# Patient Record
Sex: MALE | Race: WHITE | NOT HISPANIC OR LATINO | Employment: FULL TIME | URBAN - NONMETROPOLITAN AREA
[De-identification: names, ages, dates, MRNs, and addresses within clinical notes are randomized per-mention and may not be internally consistent; named-entity substitution may affect disease eponyms.]

---

## 2017-02-07 ENCOUNTER — OFFICE VISIT (OUTPATIENT)
Dept: FAMILY MEDICINE CLINIC | Facility: CLINIC | Age: 32
End: 2017-02-07

## 2017-02-07 VITALS
HEART RATE: 82 BPM | SYSTOLIC BLOOD PRESSURE: 108 MMHG | HEIGHT: 72 IN | WEIGHT: 206 LBS | DIASTOLIC BLOOD PRESSURE: 72 MMHG | OXYGEN SATURATION: 99 % | BODY MASS INDEX: 27.9 KG/M2

## 2017-02-07 DIAGNOSIS — R79.89 ABNORMAL LFTS: ICD-10-CM

## 2017-02-07 DIAGNOSIS — Z72.0 TOBACCO ABUSE: ICD-10-CM

## 2017-02-07 DIAGNOSIS — K21.9 GASTROESOPHAGEAL REFLUX DISEASE WITHOUT ESOPHAGITIS: ICD-10-CM

## 2017-02-07 DIAGNOSIS — F41.9 ANXIETY: ICD-10-CM

## 2017-02-07 DIAGNOSIS — E78.2 MIXED HYPERLIPIDEMIA: Primary | ICD-10-CM

## 2017-02-07 DIAGNOSIS — N39.43 DRIBBLING FOLLOWING URINATION: ICD-10-CM

## 2017-02-07 DIAGNOSIS — E55.9 VITAMIN D DEFICIENCY: ICD-10-CM

## 2017-02-07 LAB
ALBUMIN SERPL-MCNC: 4.6 G/DL (ref 3.5–5)
ALBUMIN/GLOB SERPL: 1.4 G/DL (ref 1.5–2.5)
ALP SERPL-CCNC: 60 U/L (ref 46–116)
ALT SERPL W P-5'-P-CCNC: 72 U/L (ref 10–44)
ANION GAP SERPL CALCULATED.3IONS-SCNC: 2.5 MMOL/L (ref 3.6–11.2)
AST SERPL-CCNC: 41 U/L (ref 10–34)
BASOPHILS # BLD AUTO: 0.08 10*3/MM3 (ref 0–0.3)
BASOPHILS NFR BLD AUTO: 1.4 % (ref 0–2)
BILIRUB BLD-MCNC: NEGATIVE MG/DL
BILIRUB SERPL-MCNC: 0.5 MG/DL (ref 0.2–1.8)
BUN BLD-MCNC: 15 MG/DL (ref 7–21)
BUN/CREAT SERPL: 16.7 (ref 7–25)
CALCIUM SPEC-SCNC: 9.5 MG/DL (ref 7.7–10)
CHLORIDE SERPL-SCNC: 108 MMOL/L (ref 99–112)
CHOLEST SERPL-MCNC: 209 MG/DL (ref 0–200)
CLARITY, POC: CLEAR
CO2 SERPL-SCNC: 29.5 MMOL/L (ref 24.3–31.9)
COLOR UR: YELLOW
CREAT BLD-MCNC: 0.9 MG/DL (ref 0.43–1.29)
DEPRECATED RDW RBC AUTO: 40.8 FL (ref 37–54)
EOSINOPHIL # BLD AUTO: 0.25 10*3/MM3 (ref 0–0.7)
EOSINOPHIL NFR BLD AUTO: 4.2 % (ref 0–5)
ERYTHROCYTE [DISTWIDTH] IN BLOOD BY AUTOMATED COUNT: 13.1 % (ref 11.5–14.5)
GFR SERPL CREATININE-BSD FRML MDRD: 100 ML/MIN/1.73
GLOBULIN UR ELPH-MCNC: 3.2 GM/DL
GLUCOSE BLD-MCNC: 100 MG/DL (ref 70–110)
GLUCOSE UR STRIP-MCNC: NEGATIVE MG/DL
HCT VFR BLD AUTO: 44.8 % (ref 42–52)
HDLC SERPL-MCNC: 38 MG/DL (ref 60–100)
HGB BLD-MCNC: 15.2 G/DL (ref 14–18)
IMM GRANULOCYTES # BLD: 0.01 10*3/MM3 (ref 0–0.03)
IMM GRANULOCYTES NFR BLD: 0.2 % (ref 0–0.5)
KETONES UR QL: ABNORMAL
LDLC SERPL CALC-MCNC: 123 MG/DL (ref 0–100)
LDLC/HDLC SERPL: 3.25 {RATIO}
LEUKOCYTE EST, POC: NEGATIVE
LYMPHOCYTES # BLD AUTO: 2.37 10*3/MM3 (ref 1–3)
LYMPHOCYTES NFR BLD AUTO: 40.1 % (ref 21–51)
MCH RBC QN AUTO: 29.3 PG (ref 27–33)
MCHC RBC AUTO-ENTMCNC: 33.9 G/DL (ref 33–37)
MCV RBC AUTO: 86.5 FL (ref 80–94)
MONOCYTES # BLD AUTO: 0.54 10*3/MM3 (ref 0.1–0.9)
MONOCYTES NFR BLD AUTO: 9.1 % (ref 0–10)
NEUTROPHILS # BLD AUTO: 2.66 10*3/MM3 (ref 1.4–6.5)
NEUTROPHILS NFR BLD AUTO: 45 % (ref 30–70)
NITRITE UR-MCNC: NEGATIVE MG/ML
OSMOLALITY SERPL CALC.SUM OF ELEC: 280.3 MOSM/KG (ref 273–305)
PH UR: 6 [PH] (ref 5–8)
PLATELET # BLD AUTO: 378 10*3/MM3 (ref 130–400)
PMV BLD AUTO: 10 FL (ref 6–10)
POTASSIUM BLD-SCNC: 4.3 MMOL/L (ref 3.5–5.3)
PROT SERPL-MCNC: 7.8 G/DL (ref 6–8)
PROT UR STRIP-MCNC: NEGATIVE MG/DL
RBC # BLD AUTO: 5.18 10*6/MM3 (ref 4.7–6.1)
RBC # UR STRIP: NEGATIVE /UL
SODIUM BLD-SCNC: 140 MMOL/L (ref 135–153)
SP GR UR: 1.03 (ref 1–1.03)
TRIGL SERPL-MCNC: 238 MG/DL (ref 0–150)
UROBILINOGEN UR QL: NORMAL
VLDLC SERPL-MCNC: 47.6 MG/DL
WBC NRBC COR # BLD: 5.91 10*3/MM3 (ref 4.5–12.5)

## 2017-02-07 PROCEDURE — 80053 COMPREHEN METABOLIC PANEL: CPT | Performed by: FAMILY MEDICINE

## 2017-02-07 PROCEDURE — 82977 ASSAY OF GGT: CPT | Performed by: FAMILY MEDICINE

## 2017-02-07 PROCEDURE — 99214 OFFICE O/P EST MOD 30 MIN: CPT | Performed by: FAMILY MEDICINE

## 2017-02-07 PROCEDURE — 85025 COMPLETE CBC W/AUTO DIFF WBC: CPT | Performed by: FAMILY MEDICINE

## 2017-02-07 PROCEDURE — 36415 COLL VENOUS BLD VENIPUNCTURE: CPT | Performed by: FAMILY MEDICINE

## 2017-02-07 PROCEDURE — 81003 URINALYSIS AUTO W/O SCOPE: CPT | Performed by: FAMILY MEDICINE

## 2017-02-07 PROCEDURE — 80061 LIPID PANEL: CPT | Performed by: FAMILY MEDICINE

## 2017-02-07 RX ORDER — ERGOCALCIFEROL (VITAMIN D2) 10 MCG
400 TABLET ORAL DAILY
COMMUNITY
End: 2017-08-09 | Stop reason: HOSPADM

## 2017-02-07 RX ORDER — BUPROPION HYDROCHLORIDE 150 MG/1
TABLET, EXTENDED RELEASE ORAL
Qty: 60 TABLET | Refills: 2 | Status: SHIPPED | OUTPATIENT
Start: 2017-02-07 | End: 2017-08-09 | Stop reason: HOSPADM

## 2017-02-07 NOTE — PATIENT INSTRUCTIONS
We will call you with lab results.  Start wellbutrin once a day x 3 days, then do twice a day x 3 months.   Come back in three months and we will reassess.

## 2017-02-09 ENCOUNTER — TELEPHONE (OUTPATIENT)
Dept: FAMILY MEDICINE CLINIC | Facility: CLINIC | Age: 32
End: 2017-02-09

## 2017-02-09 DIAGNOSIS — R79.89 ABNORMAL LFTS: Primary | ICD-10-CM

## 2017-02-09 LAB — GGT SERPL-CCNC: 55 U/L (ref 11–50)

## 2017-02-09 NOTE — TELEPHONE ENCOUNTER
Ok. GGT is positive so this means that liver enzymes are definitely elevated because of the liver and not because of something else. Maybe he just has a fatty liver. Thanks.

## 2017-02-09 NOTE — TELEPHONE ENCOUNTER
----- Message from Matilde Brar MD sent at 2/8/2017  5:50 PM EST -----  Can we add a GGT (gamma-glutamyl transferase) to this?     Please call patient. We should be really excited that he has lost almost 10 pounds in half of a year, but ...  1) His liver enzymes are still elevated. Compared to his mid-2016 levels, they are about the same (59 and 31). He does not need to come in special to this, but I think it may be time to do a workup next time he comes in. I'll discuss more of the labs that we can order (and if we can order the GGT, please do so because that is a liver enzyme specific to the liver). The CT chest that we got last year did catch some of the liver and it looked fine, so I am not too worried about it, but we should try to figure out why the numbers are still elevated if he is doing the right stuff. Remind him lots of water, and decrease protein powder if he is taking that.  2) The cholesterol numbers are also very similar to what they are last year. They are some better, but honestly, it is pretty much the same. The current guidelines state that he's fine - he just needs to diet and exercise; the old guidelines recommend something like a fibrate, which I really don't wish for him secondary to his elevated liver enzymes. They are better so he has made some good choices, but this may also be very genetic. Like #1, I'd like to do the liver workup so I know if we can give him something for cholesterol. Thanks.      First i was able to add the lab,second spoke with patient & he verbalized understanding,also wanted you to know that the weekend prior to these labs he had consumed an abnormally large amout of alcohol which he does not do very often.

## 2017-02-20 NOTE — PROGRESS NOTES
"Adiel Olmos     VITALS: Blood pressure 108/72, pulse 82, height 72\" (182.9 cm), weight 206 lb (93.4 kg), SpO2 99 %.    Subjective  Chief Complaint:   Chief Complaint   Patient presents with   • Follow-up        History of Present Illness:  Patient is a 30 y.o. male with a medical history significant for anxiety who presents to clinic secondary to medical followup. Some ongoing concerns; otherwise doing fairly well. Wife is currently pregnant with Child #2.     1) Had seen surgery regarding his bony lesion; decision was made to monitor and that it is most likely benign cartilage. Patient is satisfied with this conclusion.     2) Had discussed lack of concentration ability and focus at his last visit. States that he catches himself not participating in conversations and thinking of other things. States that he is a little worried because he just feels a little bit off. This continues to be stable. Thinks that it may be anxiety. Symptoms have not worsened. May have improved slightly.     3) Would like to try a medication to stop smoking.     Patient also has a history of hyperlipidemia and is currently on diet and exercise.Last lipid panel in 2/2016 and was elevated. Denies any muscle weakness, jaundice or itching.   Low cholesterol diet: Yes    Patient has a history of GERD and is currently on no medications. Denies metallic tastes and has not been having increased symptoms during sleep.Has not had any recent exacerbations. Denies any nausea, vomiting, burping, hiccuping, or midepigastric pain.    Patient has a history of anxiety and is currently on no medications. Patient states that he is sleeping fairly well and can get out of bed daily to accomplish daily activities. Patient has anhedonia. Mood is anxious. Has no feelings of guilt. Has poor concentration and memory ability. Has no energy. Is able to make goals.Denies any suicidal or homicidal ideations. Does not have any auditory or visual hallucinations. "     The following portions of the patient's history were reviewed and updated as appropriate: allergies, current medications, past family history, past medical history, past social history, past surgical history and problem list.    Past Medical History  Past Medical History   Diagnosis Date   • Anxiety    • Fatigue    • GERD (gastroesophageal reflux disease)    • Hyperlipidemia    • Lower back pain    • Vitamin D deficiency        Review of Systems  Constitutional: Denies any recent history of HAs, dizziness, fevers, chills, itching.  Eyes: Denies any changes in vision. Denies any blurry vision or diplopia.  Ears, Nose, Mouth, Throat: Denies any sore throat, rhinorrhea, or cough.  Cardiovascular: Denies any chest pain, pressure, or palpitations.  Respiratory: Denies any shortness of breath or wheezing.  Gastrointestinal: Denies any abdominal pain, nausea, vomiting, diarrhea, or constipation.  Genitourinary: Denies any changes in urination.  Musculoskeletal: Denies any muscle weakness.  Skin and/or breasts: Denies any rashes.  Neurological: Denies any changes in balance or gait.  Psychiatric: Denies any suicidal or homicidal ideations.  Endocrine: Denies any heat or cold intolerance. Denies any voice changes, polydipsia, or polyuria.  Hematologic/Lymphatic: Denies any anemia or easy bruising.    Surgical History  Past Surgical History   Procedure Laterality Date   • Eye surgery         Family History  Family History   Problem Relation Age of Onset   • Thyroid disease Mother    • Diabetes Mother    • Hypertension Father    • Cancer Maternal Grandfather        Social History  Social History     Social History   • Marital status:      Spouse name: N/A   • Number of children: N/A   • Years of education: N/A     Occupational History   • Not on file.     Social History Main Topics   • Smoking status: Current Every Day Smoker     Packs/day: 0.50     Types: Cigarettes   • Smokeless tobacco: Never Used   • Alcohol use  No   • Drug use: No   • Sexual activity: Not on file     Other Topics Concern   • Not on file     Social History Narrative       Objective  Physical Exam  Gen: Patient in NAD. Pleasant and answers appropriately. A&Ox3.    Skin: Warm and dry with normal turgor. No purpura, rashes, or unusual pigmentation noted. Hair is normal in appearance and distribution.    HEENT: NC/AT. No lesions noted. Conjunctiva clear, sclera nonicteric. PERRL. O/P nonerythematous and moist without exudate.    Neck: Supple without lymph nodes palpated. FROM. No evidence of tracheal deviation or thyromegaly. Carotid pulses 2+/4 B/L without bruits.    Lungs: CTA B/L without rales, rhonchi, crackles, or wheezes.    Heart: RRR. S1 and S2 normal. No S3 or S4. No MRGT.    Abd: Soft, nontender,nondistended. (+)BSx4 quadrants.  No HSM, masses, or bruits noted.    Extrem: No CCE. Radial and dorsalis pedis pulses 2+/4 and equal B/L. FROMx4. No bone, joint, or muscle tenderness noted.    Neuro: No focal motor/sensory deficits.    Procedures    Assessment/Plan  Adiel Olmos is a 31 y.o. here for medical followup.  Diagnoses and all orders for this visit:    1) Mixed hyperlipidemia  -     CBC & Differential  -     Comprehensive Metabolic Panel  -     Lipid Panel  -     CBC Auto Differential  -     Osmolality, Calculated; Future  -     Osmolality, Calculated  Currently on diet and exercise. Will check labs today: CBC, CMP, lipids.    2) Dribbling following urination  -     POC Urinalysis Dipstick, Automated  Will increase fluids. U/A benign. Uncertain etiology. Continue to monitor.    3) Abnormal LFTs  -     Gamma GT  History of elevation of liver enzymes. Will check CMP and GGT today. Most likely secondary to fatty liver.    4) Gastroesophageal reflux disease without esophagitis  Stable. Continue to monitor.      5) Vitamin D deficiency  Stable. Continue Vitamin D repletion. Continue to monitor.      6) Anxiety  Patient does not want any anti-anxiety  medications; already sees a therapist for CBT. Will continue to monitor. Wellbutrin may help.      7) Tobacco abuse  After discussion, will start patient on wellbutrin SR !50 mg orally BID. Will monitor. Follow up in three months.     Other orders  -     buPROPion SR (WELLBUTRIN SR) 150 MG 12 hr tablet; Start at 150 mg orally daily x 3 days, and then do 150 mg orally BID x 12 weeks.    Findings and plans discussed with patient who verbalizes understanding and agreement. Will followup with patient once results are in. Patient to followup at clinic PRN or in three months for medical followup.    Matilde Brar MD

## 2017-03-27 ENCOUNTER — OFFICE VISIT (OUTPATIENT)
Dept: FAMILY MEDICINE CLINIC | Facility: CLINIC | Age: 32
End: 2017-03-27

## 2017-03-27 VITALS
DIASTOLIC BLOOD PRESSURE: 81 MMHG | WEIGHT: 207 LBS | HEIGHT: 72 IN | HEART RATE: 96 BPM | SYSTOLIC BLOOD PRESSURE: 129 MMHG | BODY MASS INDEX: 28.04 KG/M2 | OXYGEN SATURATION: 99 %

## 2017-03-27 DIAGNOSIS — K21.9 GASTROESOPHAGEAL REFLUX DISEASE WITHOUT ESOPHAGITIS: ICD-10-CM

## 2017-03-27 DIAGNOSIS — F41.9 ANXIETY: ICD-10-CM

## 2017-03-27 DIAGNOSIS — J02.9 SORE THROAT: ICD-10-CM

## 2017-03-27 DIAGNOSIS — R74.8 ELEVATED LIVER ENZYMES: Primary | ICD-10-CM

## 2017-03-27 DIAGNOSIS — E78.2 MIXED HYPERLIPIDEMIA: ICD-10-CM

## 2017-03-27 LAB
ALBUMIN SERPL-MCNC: 4.9 G/DL (ref 3.5–5)
ALBUMIN/GLOB SERPL: 1.8 G/DL (ref 1.5–2.5)
ALP SERPL-CCNC: 65 U/L (ref 40–129)
ALT SERPL W P-5'-P-CCNC: 89 U/L (ref 10–44)
ANION GAP SERPL CALCULATED.3IONS-SCNC: 4.7 MMOL/L (ref 3.6–11.2)
AST SERPL-CCNC: 36 U/L (ref 10–34)
BILIRUB SERPL-MCNC: 0.5 MG/DL (ref 0.2–1.8)
BUN BLD-MCNC: 14 MG/DL (ref 7–21)
BUN/CREAT SERPL: 15.6 (ref 7–25)
CALCIUM SPEC-SCNC: 9.7 MG/DL (ref 7.7–10)
CHLORIDE SERPL-SCNC: 106 MMOL/L (ref 99–112)
CO2 SERPL-SCNC: 29.3 MMOL/L (ref 24.3–31.9)
CREAT BLD-MCNC: 0.9 MG/DL (ref 0.43–1.29)
GFR SERPL CREATININE-BSD FRML MDRD: 100 ML/MIN/1.73
GLOBULIN UR ELPH-MCNC: 2.8 GM/DL
GLUCOSE BLD-MCNC: 128 MG/DL (ref 70–110)
OSMOLALITY SERPL CALC.SUM OF ELEC: 281.5 MOSM/KG (ref 273–305)
POTASSIUM BLD-SCNC: 3.9 MMOL/L (ref 3.5–5.3)
PROT SERPL-MCNC: 7.7 G/DL (ref 6–8)
SODIUM BLD-SCNC: 140 MMOL/L (ref 135–153)
T4 FREE SERPL-MCNC: 1.36 NG/DL (ref 0.89–1.76)
TSH SERPL DL<=0.05 MIU/L-ACNC: 0.83 MIU/ML (ref 0.55–4.78)

## 2017-03-27 PROCEDURE — 84443 ASSAY THYROID STIM HORMONE: CPT | Performed by: FAMILY MEDICINE

## 2017-03-27 PROCEDURE — 90471 IMMUNIZATION ADMIN: CPT | Performed by: FAMILY MEDICINE

## 2017-03-27 PROCEDURE — 99214 OFFICE O/P EST MOD 30 MIN: CPT | Performed by: FAMILY MEDICINE

## 2017-03-27 PROCEDURE — 90715 TDAP VACCINE 7 YRS/> IM: CPT | Performed by: FAMILY MEDICINE

## 2017-03-27 PROCEDURE — 80053 COMPREHEN METABOLIC PANEL: CPT | Performed by: FAMILY MEDICINE

## 2017-03-27 PROCEDURE — 80074 ACUTE HEPATITIS PANEL: CPT | Performed by: FAMILY MEDICINE

## 2017-03-27 PROCEDURE — 84439 ASSAY OF FREE THYROXINE: CPT | Performed by: FAMILY MEDICINE

## 2017-03-27 PROCEDURE — 36415 COLL VENOUS BLD VENIPUNCTURE: CPT | Performed by: FAMILY MEDICINE

## 2017-03-28 ENCOUNTER — TELEPHONE (OUTPATIENT)
Dept: FAMILY MEDICINE CLINIC | Facility: CLINIC | Age: 32
End: 2017-03-28

## 2017-03-28 NOTE — TELEPHONE ENCOUNTER
----- Message from Matilde Brar MD sent at 3/27/2017  7:29 PM EDT -----  Please call patient. Liver enzymes still elevated. I know he has stopped drinking. Any Tylenol? Secondary to continued increase, I would actually recommend a hepatitis panel and a RUQ ultrasound if he is okay with it. Thanks.      Spoke with patient & he is agreeable to the u/s & hepatitis panel.He denies drinking alcohol or using Tylenol products.

## 2017-03-29 DIAGNOSIS — R74.8 ELEVATED LIVER ENZYMES: Primary | ICD-10-CM

## 2017-03-29 LAB
HAV IGM SERPL QL IA: NORMAL
HBV CORE IGM SERPL QL IA: NORMAL
HBV SURFACE AG SERPL QL IA: NORMAL
HCV AB SER DONR QL: NORMAL

## 2017-03-29 NOTE — TELEPHONE ENCOUNTER
Okay. I am adding the hepatitis panel on the blood already in lab. Let him know that (I just wanted to make sure he was okay with it), and then scheduling will call him about the U/S. Thanks.      Patient notified.

## 2017-03-29 NOTE — TELEPHONE ENCOUNTER
Okay. I am adding the hepatitis panel on the blood already in lab. Let him know that (I just wanted to make sure he was okay with it), and then scheduling will call him about the U/S. Thanks.

## 2017-03-30 ENCOUNTER — TELEPHONE (OUTPATIENT)
Dept: FAMILY MEDICINE CLINIC | Facility: CLINIC | Age: 32
End: 2017-03-30

## 2017-03-30 NOTE — TELEPHONE ENCOUNTER
----- Message from Matilde Brar MD sent at 3/29/2017  5:24 PM EDT -----  Please let him know negative for hepatitis. Scheduling will call him about U/S. Thanks.      Patient notified & verbalized understanding.

## 2017-04-06 ENCOUNTER — HOSPITAL ENCOUNTER (OUTPATIENT)
Dept: ULTRASOUND IMAGING | Facility: HOSPITAL | Age: 32
Discharge: HOME | End: 2017-04-06
Admitting: FAMILY MEDICINE

## 2017-04-06 PROCEDURE — 76705 ECHO EXAM OF ABDOMEN: CPT | Performed by: RADIOLOGY

## 2017-04-06 PROCEDURE — 76705 ECHO EXAM OF ABDOMEN: CPT

## 2017-04-07 ENCOUNTER — TELEPHONE (OUTPATIENT)
Dept: FAMILY MEDICINE CLINIC | Facility: CLINIC | Age: 32
End: 2017-04-07

## 2017-04-07 NOTE — TELEPHONE ENCOUNTER
----- Message from Matilde Brar MD sent at 4/7/2017 10:12 AM EDT -----  Please call and let him know that U/S okay. We will monitor his liver enzymes at his next visit. Is he having any abdominal pain at all? If they continue going up at next visit, will need to send to GI, but for now, we just watch. Thanks.      Left a message to return call.    Patient returned call & verbalized understanding,Denies any abdominal pain.

## 2017-04-11 NOTE — PROGRESS NOTES
"Adiel Olmos     VITALS: Blood pressure 129/81, pulse 96, height 72\" (182.9 cm), weight 207 lb (93.9 kg), SpO2 99 %.    Subjective  Chief Complaint:   Chief Complaint   Patient presents with   • Follow-up        History of Present Illness:  Patient is a 30 y.o. male with a medical history significant for anxiety who presents to clinic secondary to medical followup. Some ongoing concerns; otherwise doing fairly well. Wife is currently pregnant with Child #2.        1) Has stopped smoking - now wants to stop the wellbutrin.   2) Is having a sore throat. Is not sure if he is having a sore throat or a sore neck. No other symptoms. Denies any trouble swallowing. No fevers, chills, coughing, postnasal drop.     Patient also has a history of hyperlipidemia and is currently on diet and exercise.Last lipid panel in 2/2017 and was elevated. Denies any muscle weakness, jaundice or itching. He does also have a history of elevated liver enzymes. Asymptomatic. Has been trying to eat a better diet.   Low cholesterol diet: Yes    Patient has a history of GERD and is currently on no medications. Denies metallic tastes and has not been having increased symptoms during sleep.Has not had any recent exacerbations. Denies any nausea, vomiting, burping, hiccuping, or midepigastric pain.    Patient has a history of anxiety and is currently on no medications. Patient states that he is sleeping fairly well and can get out of bed daily to accomplish daily activities. Patient has anhedonia. Mood is anxious. Has no feelings of guilt. Has poor concentration and memory ability. Has no energy. Is able to make goals.Denies any suicidal or homicidal ideations. Does not have any auditory or visual hallucinations.     The following portions of the patient's history were reviewed and updated as appropriate: allergies, current medications, past family history, past medical history, past social history, past surgical history and problem " list.    Past Medical History  Past Medical History:   Diagnosis Date   • Anxiety    • Fatigue    • GERD (gastroesophageal reflux disease)    • Hyperlipidemia    • Lower back pain    • Vitamin D deficiency        Review of Systems  Constitutional: Denies any recent history of HAs, dizziness, fevers, chills, itching.  Eyes: Denies any changes in vision. Denies any blurry vision or diplopia.  Ears, Nose, Mouth, Throat: Denies any rhinorrhea or cough.  Cardiovascular: Denies any chest pain, pressure, or palpitations.  Respiratory: Denies any shortness of breath or wheezing.  Gastrointestinal: Denies any abdominal pain, nausea, vomiting, diarrhea, or constipation.  Genitourinary: Denies any changes in urination.  Musculoskeletal: Denies any muscle weakness.  Skin and/or breasts: Denies any rashes.  Neurological: Denies any changes in balance or gait.  Psychiatric: Denies any anxiety, depression, or insomnia. Denies any suicidal or homicidal ideations.  Endocrine: Denies any heat or cold intolerance. Denies any voice changes, polydipsia, or polyuria.  Hematologic/Lymphatic: Denies any anemia or easy bruising.    Surgical History  Past Surgical History:   Procedure Laterality Date   • EYE SURGERY         Family History  Family History   Problem Relation Age of Onset   • Thyroid disease Mother    • Diabetes Mother    • Hypertension Father    • Cancer Maternal Grandfather        Social History  Social History     Social History   • Marital status:      Spouse name: N/A   • Number of children: N/A   • Years of education: N/A     Occupational History   • Not on file.     Social History Main Topics   • Smoking status: Former Smoker     Packs/day: 0.50     Types: Cigarettes   • Smokeless tobacco: Never Used   • Alcohol use No   • Drug use: No   • Sexual activity: Not on file     Other Topics Concern   • Not on file     Social History Narrative       Objective  Physical Exam  Gen: Patient in NAD. Pleasant and answers  appropriately. A&Ox3.    Skin: Warm and dry with normal turgor. No purpura, rashes, or unusual pigmentation noted. Hair is normal in appearance and distribution.    HEENT: NC/AT. No lesions noted. Conjunctiva clear, sclera nonicteric. PERRL. EOMI without nystagmus or strabismus. Fundi appear benign. No hemorrhages or exudates of eyes. Auditory canals are patent bilaterally without lesions. TMs intact, nonerythematous, nonbulging without lesions. Nasal mucosa pink, nonerythematous, and nonedematous. Frontal and  maxillary sinuses are nontender.  O/P nonerythematous and moist without exudate. No masses noted.    Neck: Supple without lymph nodes palpated. FROM. No evidence of tracheal deviation or thyromegaly. Carotid pulses 2+/4 B/L without bruits.     Lungs: CTA B/L without rales, rhonchi, crackles, or wheezes.    Heart: RRR. S1 and S2 normal. No S3 or S4. No MRGT.    Abd: Soft, nontender,nondistended. (+)BSx4 quadrants. No HSM, masses, or bruits noted.    Extrem: No CCE. Radial pulses 2+/4 and equal B/L. FROMx4. No bone, joint, or muscle tenderness noted.    Neuro:  No focal motor/sensory deficits.    Procedures    Assessment/Plan  Adiel Olmos is a 31 y.o. here for medical followup.  Diagnoses and all orders for this visit:    1) Elevated liver enzymes  -     Comprehensive Metabolic Panel  -     TSH  -     T4, Free  -     Osmolality, Calculated; Future  -     Osmolality, Calculated  -     Hepatitis Panel, Acute; Future  -     Hepatitis Panel, Acute  Will recheck CMP today along with hepatitis panel. If continues to be elevated, may need to consider liver ultrasound.    2)  Mixed hyperlipidemia  Currently on diet and exercise secondary to elevated liver enzymes.        3) Gastroesophageal reflux disease without esophagitis  Stable. Continue to monitor.      4) Vitamin D deficiency  Stable. Continue Vitamin D repletion. Continue to monitor.      5) Anxiety  Patient does not want any anti-anxiety medications;  already sees a therapist for CBT. Will continue to monitor.       6) Tobacco abuse  Patient has quit. Continue to monitor. Patient is going to titrate down the wellbutrin SR !50 mg orally BID. Will monitor. Follow up in three months.     7) Sore throat  Uncertain etiology. Patient to increase fluids and to monitor. Will call if symptoms continue, worsen, or if dysphagia starts. May need U/S? In the meantime, will check thyroid panel.      Findings and plans discussed with patient who verbalizes understanding and agreement. Will followup with patient once results are in. Patient to followup at clinic PRN or in two months for medical followup.    Other orders  -     Tdap Vaccine Greater Than or Equal To 6yo IM      Matilde Brar MD

## 2017-05-09 ENCOUNTER — OFFICE VISIT (OUTPATIENT)
Dept: FAMILY MEDICINE CLINIC | Facility: CLINIC | Age: 32
End: 2017-05-09

## 2017-05-09 VITALS
SYSTOLIC BLOOD PRESSURE: 130 MMHG | DIASTOLIC BLOOD PRESSURE: 91 MMHG | HEIGHT: 72 IN | HEART RATE: 94 BPM | WEIGHT: 211 LBS | BODY MASS INDEX: 28.58 KG/M2 | OXYGEN SATURATION: 99 %

## 2017-05-09 DIAGNOSIS — M54.50 ACUTE BILATERAL LOW BACK PAIN WITHOUT SCIATICA: Primary | ICD-10-CM

## 2017-05-09 DIAGNOSIS — R74.8 ELEVATED LIVER ENZYMES: ICD-10-CM

## 2017-05-09 DIAGNOSIS — E78.2 MIXED HYPERLIPIDEMIA: ICD-10-CM

## 2017-05-09 DIAGNOSIS — E55.9 VITAMIN D DEFICIENCY: ICD-10-CM

## 2017-05-09 DIAGNOSIS — K21.9 GASTROESOPHAGEAL REFLUX DISEASE WITHOUT ESOPHAGITIS: ICD-10-CM

## 2017-05-09 DIAGNOSIS — F41.9 ANXIETY: ICD-10-CM

## 2017-05-09 PROCEDURE — 99214 OFFICE O/P EST MOD 30 MIN: CPT | Performed by: FAMILY MEDICINE

## 2017-07-07 ENCOUNTER — OFFICE VISIT (OUTPATIENT)
Dept: FAMILY MEDICINE CLINIC | Facility: CLINIC | Age: 32
End: 2017-07-07

## 2017-07-07 VITALS
HEART RATE: 111 BPM | BODY MASS INDEX: 27.9 KG/M2 | DIASTOLIC BLOOD PRESSURE: 88 MMHG | OXYGEN SATURATION: 99 % | SYSTOLIC BLOOD PRESSURE: 126 MMHG | HEIGHT: 72 IN | WEIGHT: 206 LBS

## 2017-07-07 DIAGNOSIS — K21.9 GASTROESOPHAGEAL REFLUX DISEASE WITHOUT ESOPHAGITIS: ICD-10-CM
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
## 2017-07-07 DIAGNOSIS — F41.9 ANXIETY: ICD-10-CM

## 2017-07-07 DIAGNOSIS — R74.8 ELEVATED LIVER ENZYMES: ICD-10-CM

## 2017-07-07 DIAGNOSIS — E78.2 MIXED HYPERLIPIDEMIA: ICD-10-CM

## 2017-07-07 LAB
ALBUMIN SERPL-MCNC: 4.9 G/DL (ref 3.5–5)
ALBUMIN/GLOB SERPL: 1.7 G/DL (ref 1.5–2.5)
ALP SERPL-CCNC: 77 U/L (ref 40–129)
ALT SERPL W P-5'-P-CCNC: 113 U/L (ref 10–44)
AMYLASE SERPL-CCNC: 41 U/L (ref 28–100)
ANION GAP SERPL CALCULATED.3IONS-SCNC: 5.4 MMOL/L (ref 3.6–11.2)
AST SERPL-CCNC: 48 U/L (ref 10–34)
BASOPHILS # BLD AUTO: 0.14 10*3/MM3 (ref 0–0.3)
BASOPHILS NFR BLD AUTO: 2 % (ref 0–2)
BILIRUB SERPL-MCNC: 0.3 MG/DL (ref 0.2–1.8)
BUN BLD-MCNC: 11 MG/DL (ref 7–21)
BUN/CREAT SERPL: 10.6 (ref 7–25)
CALCIUM SPEC-SCNC: 9.6 MG/DL (ref 7.7–10)
CHLORIDE SERPL-SCNC: 106 MMOL/L (ref 99–112)
CK SERPL-CCNC: 80 U/L (ref 24–204)
CO2 SERPL-SCNC: 29.6 MMOL/L (ref 24.3–31.9)
CORTIS SERPL-MCNC: 6 MCG/DL
CREAT BLD-MCNC: 1.04 MG/DL (ref 0.43–1.29)
DEPRECATED RDW RBC AUTO: 39.8 FL (ref 37–54)
EOSINOPHIL # BLD AUTO: 0.18 10*3/MM3 (ref 0–0.7)
EOSINOPHIL NFR BLD AUTO: 2.6 % (ref 0–5)
ERYTHROCYTE [DISTWIDTH] IN BLOOD BY AUTOMATED COUNT: 12.7 % (ref 11.5–14.5)
FERRITIN SERPL-MCNC: 295 NG/ML (ref 21.9–321.7)
GFR SERPL CREATININE-BSD FRML MDRD: 84 ML/MIN/1.73
GLOBULIN UR ELPH-MCNC: 2.9 GM/DL
GLUCOSE BLD-MCNC: 87 MG/DL (ref 70–110)
HCT VFR BLD AUTO: 45.1 % (ref 42–52)
HGB BLD-MCNC: 15.5 G/DL (ref 14–18)
IMM GRANULOCYTES # BLD: 0.02 10*3/MM3 (ref 0–0.03)
IMM GRANULOCYTES NFR BLD: 0.3 % (ref 0–0.5)
LIPASE SERPL-CCNC: 43 U/L (ref 13–60)
LYMPHOCYTES # BLD AUTO: 2.79 10*3/MM3 (ref 1–3)
LYMPHOCYTES NFR BLD AUTO: 40.1 % (ref 21–51)
MCH RBC QN AUTO: 29.7 PG (ref 27–33)
MCHC RBC AUTO-ENTMCNC: 34.4 G/DL (ref 33–37)
MCV RBC AUTO: 86.4 FL (ref 80–94)
MONOCYTES # BLD AUTO: 0.64 10*3/MM3 (ref 0.1–0.9)
MONOCYTES NFR BLD AUTO: 9.2 % (ref 0–10)
NEUTROPHILS # BLD AUTO: 3.18 10*3/MM3 (ref 1.4–6.5)
NEUTROPHILS NFR BLD AUTO: 45.8 % (ref 30–70)
OSMOLALITY SERPL CALC.SUM OF ELEC: 280 MOSM/KG (ref 273–305)
PLATELET # BLD AUTO: 421 10*3/MM3 (ref 130–400)
PMV BLD AUTO: 10 FL (ref 6–10)
POTASSIUM BLD-SCNC: 4.2 MMOL/L (ref 3.5–5.3)
PROT SERPL-MCNC: 7.8 G/DL (ref 6–8)
RBC # BLD AUTO: 5.22 10*6/MM3 (ref 4.7–6.1)
SODIUM BLD-SCNC: 141 MMOL/L (ref 135–153)
WBC NRBC COR # BLD: 6.95 10*3/MM3 (ref 4.5–12.5)

## 2017-07-07 PROCEDURE — 86256 FLUORESCENT ANTIBODY TITER: CPT | Performed by: FAMILY MEDICINE

## 2017-07-07 PROCEDURE — 80053 COMPREHEN METABOLIC PANEL: CPT | Performed by: FAMILY MEDICINE

## 2017-07-07 PROCEDURE — 83520 IMMUNOASSAY QUANT NOS NONAB: CPT | Performed by: FAMILY MEDICINE

## 2017-07-07 PROCEDURE — 83516 IMMUNOASSAY NONANTIBODY: CPT | Performed by: FAMILY MEDICINE

## 2017-07-07 PROCEDURE — 82150 ASSAY OF AMYLASE: CPT | Performed by: FAMILY MEDICINE

## 2017-07-07 PROCEDURE — 86376 MICROSOMAL ANTIBODY EACH: CPT | Performed by: FAMILY MEDICINE

## 2017-07-07 PROCEDURE — 86702 HIV-2 ANTIBODY: CPT | Performed by: FAMILY MEDICINE

## 2017-07-07 PROCEDURE — 82390 ASSAY OF CERULOPLASMIN: CPT | Performed by: FAMILY MEDICINE

## 2017-07-07 PROCEDURE — 83690 ASSAY OF LIPASE: CPT | Performed by: FAMILY MEDICINE

## 2017-07-07 PROCEDURE — 82728 ASSAY OF FERRITIN: CPT | Performed by: FAMILY MEDICINE

## 2017-07-07 PROCEDURE — 36415 COLL VENOUS BLD VENIPUNCTURE: CPT | Performed by: FAMILY MEDICINE

## 2017-07-07 PROCEDURE — 82533 TOTAL CORTISOL: CPT | Performed by: FAMILY MEDICINE

## 2017-07-07 PROCEDURE — 82550 ASSAY OF CK (CPK): CPT | Performed by: FAMILY MEDICINE

## 2017-07-07 PROCEDURE — 85025 COMPLETE CBC W/AUTO DIFF WBC: CPT | Performed by: FAMILY MEDICINE

## 2017-07-07 PROCEDURE — 99214 OFFICE O/P EST MOD 30 MIN: CPT | Performed by: FAMILY MEDICINE

## 2017-07-07 PROCEDURE — 96372 THER/PROPH/DIAG INJ SC/IM: CPT | Performed by: FAMILY MEDICINE

## 2017-07-07 PROCEDURE — 86038 ANTINUCLEAR ANTIBODIES: CPT | Performed by: FAMILY MEDICINE

## 2017-07-07 RX ORDER — CEFTRIAXONE 500 MG/1
500 INJECTION, POWDER, FOR SOLUTION INTRAMUSCULAR; INTRAVENOUS ONCE
Status: COMPLETED | OUTPATIENT
Start: 2017-07-07 | End: 2017-07-07

## 2017-07-07 RX ORDER — DOXYCYCLINE HYCLATE 100 MG
100 TABLET ORAL 2 TIMES DAILY
Qty: 28 TABLET | Refills: 0 | Status: SHIPPED | OUTPATIENT
Start: 2017-07-07 | End: 2017-08-09 | Stop reason: HOSPADM

## 2017-07-07 RX ADMIN — CEFTRIAXONE 500 MG: 500 INJECTION, POWDER, FOR SOLUTION INTRAMUSCULAR; INTRAVENOUS at 14:57

## 2017-07-10 ENCOUNTER — TELEPHONE (OUTPATIENT)
Dept: FAMILY MEDICINE CLINIC | Facility: CLINIC | Age: 32
End: 2017-07-10

## 2017-07-10 DIAGNOSIS — R74.8 ELEVATED LIVER ENZYMES: Primary | ICD-10-CM

## 2017-07-10 LAB
ACTIN IGG SERPL-ACNC: 6 UNITS (ref 0–19)
ALP LIVER CFR SERPL: <1 UNITS (ref 0–20)
ANA SER QL: NEGATIVE
C-ANCA TITR SER IF: NORMAL TITER
CERULOPLASMIN SERPL-MCNC: 29 MG/DL (ref 16–31)
DEPRECATED MITOCHONDRIA M2 IGG SER-ACNC: <20 UNITS (ref 0–20)
HIV 2 AB SERPL QL IA: NEGATIVE
IGA SERPL-MCNC: 414 MG/DL (ref 90–386)
IGG SERPL-MCNC: 982 MG/DL (ref 700–1600)
IGM SERPL-MCNC: 90 MG/DL (ref 20–172)
MYELOPEROXIDASE AB SER-ACNC: <9 U/ML (ref 0–9)
P-ANCA ATYPICAL TITR SER IF: NORMAL TITER
P-ANCA TITR SER IF: NORMAL TITER
PROTEINASE3 AB SER IA-ACNC: <3.5 U/ML (ref 0–3.5)

## 2017-07-10 RX ORDER — AMOXICILLIN 500 MG/1
500 CAPSULE ORAL 3 TIMES DAILY
Qty: 42 CAPSULE | Refills: 0 | Status: SHIPPED | OUTPATIENT
Start: 2017-07-10 | End: 2017-08-09 | Stop reason: HOSPADM

## 2017-07-10 NOTE — TELEPHONE ENCOUNTER
----- Message from Matilde Brar MD sent at 7/10/2017  4:40 AM EDT -----  Please call Adiel.   His liver enyzmes are continuing to climb. I'm getting the liver workup back slowly but surely - it is all negative (it's not complete yet). Is he drinking water? Two things worry me - one liver enzyme is over 100 and his creatinine is also creeping up.   1) I need to him drink water - at least 8 glasses a day.   2) Since they've moved to [place], I'm going to send him to the liver clinic. It's time. By the time, he gets in, we'll have the blood work all back - they may want to add to that/do a liver biopsy. We need to find out what's going on.   3) He is actually on doxycycline for his tick bites. I am actually going to change that to something that is a little less harsh on his liver now that we know new numbers. It will be amoxicillin 500 mg orally TID x 14 days, #42, no refills. Please send this in to whatever pharm he wants - (now in [place]). Thanks.   4) I actually would like a repeat CMP when he finishes the amoxicillin. Can he go to [facility] and get it done? It will be in the computer - just go to outpatient lab. Thanks.      Spoke with patient & he verbalized understanding ,will increase his water intake & is agreeable to the liver clinic,med sent as requested.

## 2017-07-10 NOTE — PROGRESS NOTES
"Adiel Olmos     VITALS: Blood pressure 126/88, pulse 111, height 72\" (182.9 cm), weight 206 lb (93.4 kg), SpO2 99 %.    Subjective  Chief Complaint:   Chief Complaint   Patient presents with   • Insect Bite   • Fatigue   • Pain        History of Present Illness:  Patient is a 31 year old male with a medical history significant for anxiety who presents to clinic secondary to medical followup. Some ongoing concerns; otherwise doing fairly well. Wife is currently pregnant with Child #2 due in August. They have also moved to [place].       1) Comes into clinic today secondary to several tick bites. States that the tick bites have some erythema around them. He is certain he has removed all the ticks from his body. Complains of fatigue and pain since the tick bites have occurred.     Patient does have a history of elevated liver enzymes of uncertain etiology. He has stopped alcohol intake and has stopped any tylenol intake. Preliminary workup has been negative. RUQ U/S has been negative. He has tried switching his diet and has started eating less processed foods without any success.     Patient also has a history of hyperlipidemia and is currently on diet and exercise.Last lipid panel in 2/2017 and was elevated. Denies any muscle weakness, jaundice or itching. He does also have a history of elevated liver enzymes. Asymptomatic. Has been trying to eat a better diet.   Low cholesterol diet: Yes    Patient has a history of GERD and is currently on no medications. Denies metallic tastes and has not been having increased symptoms during sleep.Has not had any recent exacerbations. Denies any nausea, vomiting, burping, hiccuping, or midepigastric pain.    Patient has a history of anxiety and is currently on no medications. Patient states that he is sleeping fairly well and can get out of bed daily to accomplish daily activities. Patient has anhedonia. Mood is anxious. Has no feelings of guilt. Has poor concentration and " memory ability. Has no energy. Is able to make goals.Denies any suicidal or homicidal ideations. Does not have any auditory or visual hallucinations.     No complaints about any of the medications.    The following portions of the patient's history were reviewed and updated as appropriate: allergies, current medications, past family history, past medical history, past social history, past surgical history and problem list.    Past Medical History  Past Medical History:   Diagnosis Date   • Anxiety    • Fatigue    • GERD (gastroesophageal reflux disease)    • Hyperlipidemia    • Lower back pain    • Vitamin D deficiency        Review of Systems  Constitutional: Denies any recent history of HAs, dizziness, fevers, chills, itching.  Eyes: Denies any changes in vision. Denies any blurry vision or diplopia.  Ears, Nose, Mouth, Throat: Denies any sore throat, rhinorrhea, or cough.  Cardiovascular: Denies any chest pain, pressure, or palpitations.  Respiratory: Denies any shortness of breath or wheezing.  Gastrointestinal: Denies any abdominal pain, nausea, vomiting, diarrhea, or constipation.  Genitourinary: Denies any changes in urination.  Psychiatric:  Denies any suicidal or homicidal ideations.  Endocrine: Denies any heat or cold intolerance. Denies any voice changes, polydipsia, or polyuria.  Hematologic/Lymphatic: Denies any anemia or easy bruising.    Surgical History  Past Surgical History:   Procedure Laterality Date   • EYE SURGERY         Family History  Family History   Problem Relation Age of Onset   • Thyroid disease Mother    • Diabetes Mother    • Hypertension Father    • Cancer Maternal Grandfather        Social History  Social History     Social History   • Marital status:      Spouse name: N/A   • Number of children: N/A   • Years of education: N/A     Occupational History   • Not on file.     Social History Main Topics   • Smoking status: Former Smoker     Packs/day: 0.50     Types: Cigarettes    • Smokeless tobacco: Never Used   • Alcohol use No   • Drug use: No   • Sexual activity: Not on file     Other Topics Concern   • Not on file     Social History Narrative       Objective  Physical Exam  Gen: Patient in NAD. Pleasant and answers appropriately. A&Ox3.    Skin: Warm and dry with normal turgor. No purpura or unusual pigmentation noted. Hair is normal in appearance and distribution. Several tick bites over body with erythema migrans noted surrounding the bite.    HEENT: NC/AT. No lesions noted. Conjunctiva clear, sclera nonicteric. PERRL. EOMI without nystagmus or strabismus. Fundi appear benign. No hemorrhages or exudates of eyes. Auditory canals are patent bilaterally without lesions. TMs intact,  nonerythematous, nonbulging without lesions. Nasal mucosa pink, nonerythematous, and nonedematous. Frontal and maxillary sinuses are nontender. O/P nonerythematous and moist without exudate.    Neck: Supple without lymph nodes palpated. FROM.     Lungs: CTA B/L without rales, rhonchi, crackles, or wheezes.    Heart: RRR. S1 and S2 normal. No S3 or S4. No MRGT.    Abd: Soft, nontender,nondistended. (+)BSx4 quadrants.     Extrem: No CCE. Radial pulses 2+/4 and equal B/L. FROMx4. No bone, joint, or muscle tenderness noted.    Neuro: No focal motor/sensory deficits.    Procedures    Assessment/Plan  Adiel Olmos is a 31 y.o. here for medical followup.  Diagnoses and all orders for this visit:    Tick bite, initial encounter  -     cefTRIAXone (ROCEPHIN) injection 500 mg; Inject 500 mg into the shoulder, thigh, or buttocks 1 (One) Time.  -     doxycycline (VIBRAMYICN) 100 MG tablet; Take 1 tablet by mouth 2 (Two) Times a Day.  Secondary to erythema migrans and symptoms, will start patient on doxycycline 100 mg orally BID x 14 days. Will also give ceftriaxone 500 mg IM x 1 time to get antibiotics in system.    Elevated liver enzymes  -     Comprehensive Metabolic Panel  -     Osmolality, Calculated;  Future  -     Osmolality, Calculated  Check CMP today. Check further labs (liver workup labs in computer). Continue to monitor. May need to send to GI.      Mixed hyperlipidemia  Currently on diet and exercise secondary to elevated liver enzymes.       Gastroesophageal reflux disease without esophagitis  Stable. Continue to monitor.      Vitamin D deficiency  Stable. Continue Vitamin D repletion. Continue to monitor.      Anxiety  Patient sees a therapist for CBT.       Tobacco abuse  Patient has quit. Continue to monitor.     Findings and plans discussed with patient who verbalizes understanding and agreement. Will followup with patient once results are in. Patient to followup at clinic PRN or in three months for medical followup.    Matilde Brar MD

## 2017-07-11 ENCOUNTER — TELEPHONE (OUTPATIENT)
Dept: FAMILY MEDICINE CLINIC | Facility: CLINIC | Age: 32
End: 2017-07-11

## 2017-07-11 NOTE — TELEPHONE ENCOUNTER
----- Message from Matilde Brar MD sent at 7/10/2017  5:36 PM EDT -----  Please call patient and let him know that all the liver labs are WNL. It will come down to more imaging and maybe some more labs, maybe a liver biopsy. We will send to GI the labs with him.

## 2017-07-11 NOTE — TELEPHONE ENCOUNTER
----- Message from Matilde Brar MD sent at 7/10/2017  5:36 PM EDT -----  Please call patient and let him know that all the liver labs are WNL. It will come down to more imaging and maybe some more labs, maybe a liver biopsy. We will send to GI the labs with him.      Spoke with patient & he verbalized understanding.

## 2017-08-09 ENCOUNTER — APPOINTMENT (OUTPATIENT)
Dept: LAB | Facility: HOSPITAL | Age: 32
End: 2017-08-09

## 2017-08-09 ENCOUNTER — OFFICE VISIT (OUTPATIENT)
Dept: GASTROENTEROLOGY | Facility: CLINIC | Age: 32
End: 2017-08-09

## 2017-08-09 VITALS
DIASTOLIC BLOOD PRESSURE: 82 MMHG | SYSTOLIC BLOOD PRESSURE: 116 MMHG | OXYGEN SATURATION: 98 % | HEIGHT: 72 IN | HEART RATE: 97 BPM | WEIGHT: 203.2 LBS | BODY MASS INDEX: 27.52 KG/M2 | TEMPERATURE: 97.1 F

## 2017-08-09 DIAGNOSIS — R79.89 LFT ELEVATION: Primary | ICD-10-CM

## 2017-08-09 LAB
ALBUMIN SERPL-MCNC: 4.6 G/DL (ref 3.2–4.8)
ALBUMIN/GLOB SERPL: 1.4 G/DL (ref 1.5–2.5)
ALP SERPL-CCNC: 67 U/L (ref 25–100)
ALT SERPL W P-5'-P-CCNC: 59 U/L (ref 7–40)
ANION GAP SERPL CALCULATED.3IONS-SCNC: 4 MMOL/L (ref 3–11)
AST SERPL-CCNC: 35 U/L (ref 0–33)
BILIRUB SERPL-MCNC: 0.5 MG/DL (ref 0.3–1.2)
BUN BLD-MCNC: 14 MG/DL (ref 9–23)
BUN/CREAT SERPL: 14 (ref 7–25)
CALCIUM SPEC-SCNC: 9.6 MG/DL (ref 8.7–10.4)
CHLORIDE SERPL-SCNC: 104 MMOL/L (ref 99–109)
CO2 SERPL-SCNC: 30 MMOL/L (ref 20–31)
CREAT BLD-MCNC: 1 MG/DL (ref 0.6–1.3)
FERRITIN SERPL-MCNC: 256 NG/ML (ref 22–322)
GFR SERPL CREATININE-BSD FRML MDRD: 88 ML/MIN/1.73
GGT SERPL-CCNC: 50 U/L (ref 0–72)
GLOBULIN UR ELPH-MCNC: 3.2 GM/DL
GLUCOSE BLD-MCNC: 106 MG/DL (ref 70–100)
HBV SURFACE AB SER RIA-ACNC: REACTIVE
IRON 24H UR-MRATE: 100 MCG/DL (ref 50–175)
IRON SATN MFR SERPL: 33 % (ref 20–50)
POTASSIUM BLD-SCNC: 4.5 MMOL/L (ref 3.5–5.5)
PROT SERPL-MCNC: 7.8 G/DL (ref 5.7–8.2)
SODIUM BLD-SCNC: 138 MMOL/L (ref 132–146)
TIBC SERPL-MCNC: 303 MCG/DL (ref 250–450)

## 2017-08-09 PROCEDURE — 99204 OFFICE O/P NEW MOD 45 MIN: CPT | Performed by: NURSE PRACTITIONER

## 2017-08-09 PROCEDURE — 82977 ASSAY OF GGT: CPT | Performed by: NURSE PRACTITIONER

## 2017-08-09 PROCEDURE — 82103 ALPHA-1-ANTITRYPSIN TOTAL: CPT | Performed by: NURSE PRACTITIONER

## 2017-08-09 PROCEDURE — 86038 ANTINUCLEAR ANTIBODIES: CPT | Performed by: NURSE PRACTITIONER

## 2017-08-09 PROCEDURE — 86706 HEP B SURFACE ANTIBODY: CPT | Performed by: NURSE PRACTITIONER

## 2017-08-09 PROCEDURE — 83516 IMMUNOASSAY NONANTIBODY: CPT | Performed by: NURSE PRACTITIONER

## 2017-08-09 PROCEDURE — 83550 IRON BINDING TEST: CPT | Performed by: NURSE PRACTITIONER

## 2017-08-09 PROCEDURE — 83540 ASSAY OF IRON: CPT | Performed by: NURSE PRACTITIONER

## 2017-08-09 PROCEDURE — 82104 ALPHA-1-ANTITRYPSIN PHENO: CPT | Performed by: NURSE PRACTITIONER

## 2017-08-09 PROCEDURE — 86708 HEPATITIS A ANTIBODY: CPT | Performed by: NURSE PRACTITIONER

## 2017-08-09 PROCEDURE — 36415 COLL VENOUS BLD VENIPUNCTURE: CPT | Performed by: NURSE PRACTITIONER

## 2017-08-09 PROCEDURE — 80053 COMPREHEN METABOLIC PANEL: CPT | Performed by: NURSE PRACTITIONER

## 2017-08-09 PROCEDURE — 82728 ASSAY OF FERRITIN: CPT | Performed by: NURSE PRACTITIONER

## 2017-08-09 NOTE — PROGRESS NOTES
OUTPATIENT NEW PATIENT NOTE  Patient: JOSE OLMOS : 1985  Date of Service: 2017  Dear Dr.Cherie ABHIJIT Brar MD   Thank you for your referral of this patient for evaluation of LFT elevation  CC: Elevated Hepatic Enzymes  Assessment/Plan                                             ASSESSMENT & PLANS     LFT elevation since 2017.  Stable, low rise.  Poss r/t fatty liver (elevated triglycerides) versus recent tick bite infection versus autoimmune.  Work up for autoimmune hepatitis is mostly done, but not complete. Results WNL so far.    -     Hepatitis A Antibody, Total  -     Hepatitis B Surface Antibody  -     Gamma GT  -     Alpha - 1 - Antitrypsin Phenotype  -     MAYA  -     Ferritin  -     Iron Profile  -     Celiac Ab tTG DGP TIgA  -     Comprehensive Metabolic Panel    Follow Up: Return in about 3 months (around 2017) for Recheck.      DISCUSSION: I explained to patient that there are different reasons that can cause LFT elevation, which include but not limited to: Viral hepatitis infection, autoimmune hepatitis, alcohol, medications (OTC, prescribed, & recreational),  primary biliary cholangitis, &/or fatty liver.  Additional work up is needed at this time. I also recommend checking for immunity against hepatitis A & hepatitis B. If blood work results are inconclusive, we will consider doing a liver biopsy for more definitive answer. The above plan was delineated in details with patient and all questions and concerns were answered.  Patient is also given contact information.  Patient is to return as scheduled or sooner if new problems arise  Subjective                                                     SUBJECTIVE   History of Present Illness  Mr. Jose Olmos is a 31 y.o. male presents to the clinic today for an evaluation of Elevated Hepatic Enzymes  Had ticks bite in May 2017 (clinically dx) and was given 1 abx for 3 days then another abx 2 wks.  Tick bites completely  resolved now  Used to drink only socially about 3-4 alcohol drinks each time about 2x a wk at most.  Has cut down significantly for the last 2 months.    Nicotine logenzes is the only new meds w/in this past yr. Seldom NSAIDs and Tylenol   Lost wt intentionally w/ watching diet. Use to be 208 lbs before and 201 lbs now  No meds for elevated triglycerides. Fam hx of HLP    He has occasional heartburn and gas, depending on diet.  Pt denies any abdominal pain, including RUQ abdominal pain.  Pt denies jaundice, pruritus, stool or urine color changes, palmar erythema, or any skin changes. No stigmata of liver disease.No change in bowel habits. Pt denies dark black stools or bright red blood in the stools, in the toilet bowl, or on the toilet tissue.  No diarrhea or constipation.  Stool character and consistency have been normal.    ROS:Review of Systems   Constitutional: Negative for activity change, appetite change, fatigue, fever and unexpected weight change.   HENT: Negative for hearing loss, trouble swallowing and voice change.    Eyes: Negative for visual disturbance.   Respiratory: Negative for cough, choking, chest tightness and shortness of breath.    Cardiovascular: Negative for chest pain.   Gastrointestinal: Negative for abdominal distention, abdominal pain, anal bleeding, blood in stool, constipation, diarrhea, nausea, rectal pain and vomiting.        Heartburn & gas   Endocrine: Negative for polydipsia and polyphagia.   Genitourinary: Negative.    Musculoskeletal: Negative for gait problem and joint swelling.   Skin: Negative for color change and rash.   Allergic/Immunologic: Negative for food allergies.   Neurological: Negative for dizziness, seizures and speech difficulty.   Hematological: Negative for adenopathy.   Psychiatric/Behavioral: Negative for confusion.     PAST MED HX: Pt  has a past medical history of Anxiety; Fatigue; GERD (gastroesophageal reflux disease); Hyperlipidemia; Lactose intolerance;  Lower back pain; and Vitamin D deficiency.  PAST SURG HX: Pt  has a past surgical history that includes Eye surgery.  FAM HX: family history includes Diabetes in his mother; VINCENT disease in his father; Hypertension in his father; Stomach cancer in his maternal grandfather; Thyroid disease in his mother.  SOC HX: Pt  reports that he has quit smoking. His smoking use included Cigarettes. He smoked 0.50 packs per day. He has never used smokeless tobacco. He reports that he does not drink alcohol or use illicit drugs. Works as a  for [employer]  Objective                                                           OBJECTIVE   Allergy: Pt is allergic to aspirin.  MEDS: No current outpatient prescriptions on file.     4/6/17 EXAMINATION: US ABDOMEN LIMITED-         CLINICAL INDICATION:     RUQ ultrasound secondary to rising liver  enzymes; R74.8-Abnormal levels of other serum enzymes      TECHNIQUE: Multiplanar gray scale ultrasound of the abdomen.      COMPARISON: NONE       FINDINGS:   Visualized pancreas is unremarkable.   The gallbladder is unremarkable without stones or wall thickening.   The CBD measures 3.73926273023 mm    .  The liver demonstrates normal echogenicity without focal lesion.    No ascites demonstrated.   There is no sonographic Wisdom sign.      IMPRESSION:  No sonographic findings to explain abdominal pain.     Lab Results   Component Value Date    WBC 6.95 07/07/2017    HGB 15.5 07/07/2017    HCT 45.1 07/07/2017    MCV 86.4 07/07/2017    MCHC 34.4 07/07/2017     (H) 07/07/2017     Lab Results   Component Value Date     07/07/2017    K 4.2 07/07/2017     07/07/2017    CO2 29.6 07/07/2017    BUN 11 07/07/2017    CREATININE 1.04 07/07/2017    GLUCOSE 87 07/07/2017    CALCIUM 9.6 07/07/2017    ANIONGAP 5.4 07/07/2017      Liver Profile     Lab Results   Component Value Date    AST 48 (H) 07/07/2017    AST 36 (H) 03/27/2017    AST 41 (H) 02/07/2017     Lab Results    Component Value Date     (H) 07/07/2017    ALT 89 (H) 03/27/2017    ALT 72 (H) 02/07/2017     Lab Results   Component Value Date    ALKPHOS 77 07/07/2017    ALKPHOS 65 03/27/2017    ALKPHOS 60 02/07/2017    GGT 55 (H) 02/07/2017     Lab Results   Component Value Date    BILITOT 0.3 07/07/2017    BILITOT 0.5 03/27/2017    BILITOT 0.5 02/07/2017    ALBUMIN 4.90 07/07/2017    ALBUMIN 4.90 03/27/2017    ALBUMIN 4.60 02/07/2017    PROTEINTOT 7.8 07/07/2017     Lab Results   Component Value Date    LIPASE 43 07/07/2017    AMYLASE 41 07/07/2017     Albumin Globulin Ratio (Normal 1.5 - 2.5 g/dL)  Lab Results   Component Value Date    LABIL2 1.7 07/07/2017    LABIL2 1.8 03/27/2017    LABIL2 1.4 (L) 02/07/2017      Alkaline Phosphatase, Isoenzymes   Lab Results   Component Value Date    LABLIVE <1.0 07/07/2017     Acute Hepatitis Panel  Lab Results   Component Value Date    HEPAIGM Non-Reactive 03/27/2017    HEPBSAG Non-Reactive 03/27/2017    HEPBIGMCORE Non-Reactive 03/27/2017    HEPCAB Non-Reactive 03/27/2017      Lab Results   Component Value Date    TRIG 238 (H) 02/07/2017    HDL 38 (L) 02/07/2017    VLDL 47.6 02/07/2017     Autoimmune Markers  Lab Results   Component Value Date    ANADIRECT Negative 07/07/2017    ANADIRECT Negative 08/16/2016     Lab Results   Component Value Date    TOTIGGREF 982 07/07/2017     (H) 07/07/2017    IGM 90 07/07/2017     Celiac Panel  Lab Results   Component Value Date     (H) 07/07/2017     IBD Panel  Lab Results   Component Value Date    ATYPPANC <1:20 07/07/2017    MYELOP <9.0 07/07/2017    PR3ABS <3.5 07/07/2017    CANCA <1:20 07/07/2017    PANCA <1:20 07/07/2017     Anti-Mitochondrial Antibody (AMA)   Lab Results   Component Value Date    MITOAB <20.0 07/07/2017     Anti-Smooth Muscle Antibody  Lab Results   Component Value Date    SMOOTHMUSCAB 6 07/07/2017     Liver-Kidney Antibody  Lab Results   Component Value Date    LABLIVE <1.0 07/07/2017     Alpha-1  Antitrypsin Phenotype  No results found for: AFPTM, PHENOTYPE  Ceruloplasmin (Normal 16.0 - 31.0 mg/dL)  Lab Results   Component Value Date    CERULOPLSM 29.0 07/07/2017     Wt Readings from Last 5 Encounters:   08/09/17 203 lb 3.2 oz (92.2 kg)   07/07/17 206 lb (93.4 kg)   05/09/17 211 lb (95.7 kg)   03/27/17 207 lb (93.9 kg)   02/07/17 206 lb (93.4 kg)   body mass index is 27.56 kg/(m^2).,Temp: 97.1 °F (36.2 °C),BP: 116/82,Heart Rate: 97   Physical Exam  General Well developed; well nourished; no acute distress.   ENT Oral mucosa pink and moist without thrush or lesions.    Neck Neck supple; trachea midline. No thyromegaly   Resp CTA; no rhonchi, rales, or wheezes.  Respiration effort normal  CV RRR; normal S1, S2; no M/R/G. No lower extremity edema  GI Abd soft, NT, ND, normal active bowel sounds.  No HSM.  No abd hernia  Skin No rash; no lesions; no bruises.  Skin turgor normal  Musc No clubbing; no cyanosis.  Gait steady  Psych Oriented to time, place, and person.  Appropriate affect  Thank you kindly for allowing me to be part of this patient’s care.    Pt care team: Ashanti FONSECA & [facility]--Gastroenterology  [phone]    CC: Dr.Cherie ABHIJIT Brar MD  [id number] [facility] [address] FAX:[phone]

## 2017-08-09 NOTE — PATIENT INSTRUCTIONS
There are different reasons that can cause LFT elevation, which include but not limited to: Viral hepatitis infection, autoimmune hepatitis, alcohol, medications (OTC, prescribed, and recreational),  primary biliary cholangitis, and/or fatty liver.  Additional work up is needed at this time. I also recommend checking for immunity against hepatitis A and hepatitis B.    Liver Function Tests  Liver function tests are blood tests to see how well your liver is working. The proteins and enzymes measured in the test can alert your health care provider to inflammation, damage, or disease in your liver. It is common to have liver function tests:  · During annual physical exams.  · When you are taking certain medicines.  · If you have liver disease.  · If you drink a lot of alcohol.  · When you are not feeling well.  · When you have other conditions that may affect the liver.  Substances measured may include:   · Alanine transaminase (ALT). This is an enzyme in the liver.  · Aspartate transaminase (AST). This is an enzyme in the liver, heart, and muscles.  · Alkaline phosphatase (ALP). This is a protein in the liver, bile ducts, and bone. It is also in other body tissues.  · Total bilirubin. This is a yellow pigment in bile.  · Albumin. This is a protein in the liver.  · Prothrombin time and international normalized ratio (PT and INR). PT measures the time that it takes for your blood to clot. INR is a calculation of blood clotting time based upon your PT result. It is also calculated based on normal ranges defined by the laboratory that processed your lab test.  · Total protein. This measures two proteins, albumin and globulin, found in the blood.  PREPARATION FOR TEST  How you prepare will depend on which tests are being done and the reason why these tests are being done. You may need to:  · Avoid eating for 4-6 hours before the test or as directed by your health care provider.  · Stop taking certain medicines prior to  your blood test as directed by your health care provider.  RESULTS   It is your responsibility to obtain your test results. Ask the lab or department performing the test when and how you will get your results. Contact your health care provider to discuss any questions you have about your results.  RANGE OF NORMAL VALUES  Ranges for normal values may vary among different labs and hospitals. You should always check with your health care provider after having lab work or other tests done to discuss the meaning of your test results and whether your values are considered within normal limits.  The following are normal ranges for substances measured in liver function tests:  ALT  · Infant: may be twice as high as adult values.  · Child or adult: 4-36 international units/L at 37°C or 4-36 units/L (SI units).  · Elderly: may be slightly higher than adult values.  AST  ·  0-5 days old:  units/L.  · Child under 3 years old: 15-60 units/L.  · 3-6 years old: 15-50 units/L.  · 6-12 years old: 10-50 units/L.  · 12-18 years old: 10-40 units/L.  · Adult: 0-35 units/L or 0-0.58 microkatal/L (SI units).  · Elderly: slightly higher than adults.  ALP  · Child under 2 years old:  units/L.  · 2-8 years old:  units/L.  · 9-15 years old:  units/L.  · 16-21 years old:  units/L.  · Adult:  units/L or 0.5-2.0 microkatal/L (SI units).  · Elderly: slightly higher than adult.  Total bilirubin  · : 1.0-12.0 mg/dL or 17.1-205 micromoles/L (SI units).  · Adult, elderly, or child: 0.3-1.0 mg/dL or 5.1-17 micromoles/L.  Albumin  · Premature infant: 3.0-4.2 g/dL.  · : 3.5-5.4 g/dL.  · Infant: 4.4-5.4 g/dL.  · Child: 4.0-5.9 g/dL.  · Adult or elderly: 3.5-5.0 g/dL or 35-50 g/L (SI units).  PT  · 11.0-12.5 seconds; 85%-100%.  INR  · 0.8-1.1.  Total protein  · Premature infant: 4.2-7.6 g/dL.  · [place]: 4.6-7.4 g/dL.  · Infant: 6.0-6.7 g/dL.  · Child: 6.2-8.0 g/dL.  · Adult or elderly: 6.4-8.3 g/dL  or 64-83 g/L (SI units).  MEANING OF RESULTS OUTSIDE NORMAL VALUE RANGES   Sometimes test results can be abnormal due to other factors, such as medicines, exercise, or pregnancy. Follow up with your health care provider if you have any questions about test results outside the normal value ranges.  ALT  · Levels above the normal range, along with other test results, may indicate liver disease.  AST  · Levels above the normal range, along with other test results, may indicate liver disease. Sometimes levels also increase after burns, surgery, heart attack, muscle damage, or seizure.  ALP  · Levels above the normal range, along with other test results, may indicate biliary obstruction, diseases of the liver, bone disease, thyroid disease, tumors, fractures, leukemia or lymphoma, or several other conditions. People with blood type O or B may show higher levels after a fatty meal.  · Levels below the normal range, along with other test results, may indicate bone and teeth conditions, malnutrition, protein deficiency, or Jose disease.  Total bilirubin  · Levels above the normal range, along with other test results, may indicate problems with the liver, gallbladder, or bile ducts.  Albumin  · Levels above the normal range, along with other test results, may indicate dehydration. They may also be caused by a diet that is high in protein. Sometimes, the band placed around the upper arm during the process of drawing blood can cause the level of this protein in your blood to rise and give you a result above the normal range.  · Levels below the normal range, along with other tests results, may indicate kidney disease, liver disease, or malabsorption of nutrients.  PT and INR  · Levels above the normal range mean your blood is clotting slower than normal. This may be due to blood disorders, liver disorders, or low levels of vitamin K.  Total protein  · Levels above the normal range, along with other test results, may be due  to infection or other diseases.  · Levels below the normal range, along with other test results, may be due to an immune system disorder, bleeding, burns, kidney disorder, liver disease, trouble absorbing or getting enough nutrients, or other conditions that affect the intestines.     This information is not intended to replace advice given to you by your health care provider. Make sure you discuss any questions you have with your health care provider.     Document Released: 01/20/2006 Document Revised: 01/08/2016 Document Reviewed: 04/23/2015  [employer] Interactive Patient Education ©2017 [employer]

## 2017-08-10 LAB
ANA SER QL: NEGATIVE
GLIADIN PEPTIDE IGA SER-ACNC: 10 UNITS (ref 0–19)
GLIADIN PEPTIDE IGG SER-ACNC: 3 UNITS (ref 0–19)
HAV AB SER QL IA: NEGATIVE
IGA SERPL-MCNC: 411 MG/DL (ref 90–386)
TTG IGA SER-ACNC: <2 U/ML (ref 0–3)
TTG IGG SER-ACNC: <2 U/ML (ref 0–5)

## 2017-08-15 LAB
A1AT SERPL-MCNC: 122 MG/DL (ref 90–200)
PHENOTYPE: NORMAL

## 2017-08-16 NOTE — PROGRESS NOTES
Pls let pt know that blood results are WNL.    No evidence of autoimmune hepatitis.    Liver enzymes are still elevated, but improved compared to previous readings.    Recent liver enzymes elevation is likely to be related to recent infection.    I recommend Hep A vaccine. No need for Hep B, based on recent blood test  Will repeat liver test when pt returns in 3 months

## 2017-08-17 ENCOUNTER — TELEPHONE (OUTPATIENT)
Dept: GASTROENTEROLOGY | Facility: CLINIC | Age: 32
End: 2017-08-17

## 2017-08-17 NOTE — TELEPHONE ENCOUNTER
----- Message from MARIAN Haddad sent at 8/16/2017  3:21 PM EDT -----  Pls let pt know that blood results are WNL.    No evidence of autoimmune hepatitis.    Liver enzymes are still elevated, but improved compared to previous readings.    Recent liver enzymes elevation is likely to be related to recent infection.    I recommend Hep A vaccine. No need for Hep B, based on recent blood test  Will repeat liver test when pt returns in 3 months

## 2017-08-17 NOTE — TELEPHONE ENCOUNTER
Patient called back. Gave his blood work results. He stated he will call back to schedule nurse visit to get Hep A vaccination after he checks his schedule.

## 2017-09-05 ENCOUNTER — TELEPHONE (OUTPATIENT)
Dept: FAMILY MEDICINE CLINIC | Facility: CLINIC | Age: 32
End: 2017-09-05

## 2017-09-05 NOTE — TELEPHONE ENCOUNTER
Called patient per Dr. Waggoner instructions to schedule him an apt. Earlier than his Oct. Apt. Patient stated he will have to check his schedule & call back for this apt.

## 2017-10-06 ENCOUNTER — OFFICE VISIT (OUTPATIENT)
Dept: FAMILY MEDICINE CLINIC | Facility: CLINIC | Age: 32
End: 2017-10-06

## 2017-10-06 VITALS
BODY MASS INDEX: 28.31 KG/M2 | HEART RATE: 76 BPM | WEIGHT: 209 LBS | SYSTOLIC BLOOD PRESSURE: 146 MMHG | HEIGHT: 72 IN | OXYGEN SATURATION: 98 % | DIASTOLIC BLOOD PRESSURE: 78 MMHG

## 2017-10-06 DIAGNOSIS — R03.0 ELEVATED BLOOD PRESSURE READING WITHOUT DIAGNOSIS OF HYPERTENSION: ICD-10-CM

## 2017-10-06 DIAGNOSIS — R23.0 PERIORAL CYANOSIS: Primary | ICD-10-CM

## 2017-10-06 DIAGNOSIS — R06.02 SOB (SHORTNESS OF BREATH): ICD-10-CM

## 2017-10-06 LAB
PEAK FLOW: 550
PEAK FLOW: 650
PEAK FLOW: 670

## 2017-10-06 PROCEDURE — 99214 OFFICE O/P EST MOD 30 MIN: CPT | Performed by: FAMILY MEDICINE

## 2017-10-06 PROCEDURE — 93000 ELECTROCARDIOGRAM COMPLETE: CPT | Performed by: FAMILY MEDICINE

## 2017-10-10 NOTE — PROGRESS NOTES
"Adiel Olmos     VITALS: Blood pressure 146/78, pulse 76, height 72\" (182.9 cm), weight 209 lb (94.8 kg), SpO2 98 %.    Subjective  Chief Complaint:   Chief Complaint   Patient presents with   • Follow-up   • Gas   • Heartburn        History of Present Illness:  Patient is a 31 year old male with a medical history significant for anxiety who presents to clinic secondary to medical followup. Some ongoing concerns; otherwise doing fairly well. Wife is currently pregnant with Child #2 due next week. They have also moved to [place].       Patient has recently taken up [id number]. 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 This has happened several times. Patient states that he does not really have any symptoms during this time. Denies any dizziness, blurry vision, shortness of breath, or chest pain. No nausea, vomiting, sweats, chills, numbness or tingling of upper extremities. He does report occasionally getting \"rumblings\" from his upper midepigastric area that he attributes to heartburn. Has been taking citrulline malate.      Patient does have a history of elevated liver enzymes of uncertain etiology. He has stopped alcohol intake and has stopped any tylenol intake. Preliminary workup has been negative. RUQ U/S has been negative. He has tried switching his diet and has started eating less processed foods without any success. We have sent him to [facility] and they are currently monitoring him.     Patient also has a history of hyperlipidemia and is currently on diet and exercise.Last lipid panel in 2/2017 and was elevated. Denies any muscle weakness, jaundice or itching. He does also have a history of elevated liver enzymes. Asymptomatic. Has been trying to eat a better diet.   Low cholesterol diet: Yes    Patient has a history of GERD and is currently on no medications. Denies metallic tastes and has not been having increased symptoms during sleep.Has not had any recent exacerbations. " Denies any nausea, vomiting, burping, hiccuping, or midepigastric pain.    Patient has a history of anxiety and is currently on no medications. Patient states that he is sleeping fairly well and can get out of bed daily to accomplish daily activities. Patient has anhedonia. Mood is anxious. Has no feelings of guilt. Has poor concentration and memory ability. Has no energy. Is able to make goals.Denies any suicidal or homicidal ideations. Does not have any auditory or visual hallucinations.      No complaints about any of the medications.    The following portions of the patient's history were reviewed and updated as appropriate: allergies, current medications, past family history, past medical history, past social history, past surgical history and problem list.    Past Medical History  Past Medical History:   Diagnosis Date   • Anxiety    • Fatigue    • GERD (gastroesophageal reflux disease)    • Hyperlipidemia    • Lactose intolerance    • Lower back pain    • Vitamin D deficiency        Review of Systems  Constitutional: Denies any recent history of HAs, dizziness, fevers, chills, itching.  Eyes: Denies any changes in vision. Denies any blurry vision or diplopia.  Ears, Nose, Mouth, Throat: Denies any sore throat, rhinorrhea, or cough.  Cardiovascular: Denies any chest pain, pressure.  Respiratory: Denies any shortness of breath or wheezing.  Gastrointestinal: Denies any abdominal pain, nausea, vomiting, diarrhea, or constipation.  Genitourinary: Denies any changes in urination.  Musculoskeletal: Denies any muscle weakness.  Skin and/or breasts: Denies any rashes.  Neurological: Denies any changes in balance or gait.  Psychiatric: Denies any anxiety, depression, or insomnia. Denies any suicidal or homicidal ideations.    Surgical History  Past Surgical History:   Procedure Laterality Date   • EYE SURGERY         Family History  Family History   Problem Relation Age of Onset   • Thyroid disease Mother    •  Diabetes Mother    • Hypertension Father    • VINCENT disease Father    • Stomach cancer Maternal Grandfather        Social History  Social History     Social History   • Marital status:      Spouse name: N/A   • Number of children: N/A   • Years of education: N/A     Occupational History   • Not on file.     Social History Main Topics   • Smoking status: Former Smoker     Packs/day: 0.50     Types: Cigarettes   • Smokeless tobacco: Never Used   • Alcohol use No   • Drug use: No   • Sexual activity: Not on file     Other Topics Concern   • Not on file     Social History Narrative       Objective  Physical Exam  Gen: Patient in NAD. Pleasant and answers appropriately. A&Ox3. Peak flow 550, 670, 650 /650    Skin: Warm and dry with normal turgor. No purpura, rashes, or unusual pigmentation noted. Hair is normal in appearance and distribution.    HEENT: NC/AT. No lesions noted. Conjunctiva clear, sclera nonicteric. PERRL. EOMI without nystagmus or strabismus. Fundi appear benign. No hemorrhages or exudates of eyes. Auditory canals are patent bilaterally without lesions. TMs intact,  nonerythematous, nonbulging without lesions. Nasal mucosa pink, nonerythematous, and nonedematous. Frontal and maxillary sinuses are nontender. O/P nonerythematous and moist without exudate.    Neck: Supple without lymph nodes palpated. FROM.     Lungs: CTA B/L without rales, rhonchi, crackles, or wheezes.    Heart: RRR. S1 and S2 normal. No S3 or S4. No MRGT.    Abd: Soft, nontender,nondistended. (+)BSx4 quadrants.     Extrem: No CCE. Radial pulses 2+/4 and equal B/L. FROMx4. No bone, joint, or muscle tenderness noted.    Neuro: No focal motor/sensory deficits.      ECG 12 Lead  Date/Time: 10/6/2017 1:35 PM  Performed by: MICHELLE HICKMAN  Authorized by: MICHELLE HICKMAN   Comparison: not compared with previous ECG   Previous ECG: no previous ECG available  Rhythm: sinus rhythm  Rate: normal  BPM: 66  Conduction: non-specific  intraventricular conduction delay  ST Segments: ST segments normal  T Waves: T waves normal  QRS axis: normal  Other: no other findings  Clinical impression: non-specific ECG  Comments: NSR with marked sinus arrhythmia. ? Right ventricular conduction delay        Assessment/Plan  Adiel Olmos is a 31 y.o. here for medical followup.  Diagnoses and all orders for this visit:    Perioral cyanosis  -     Stress Test With Myocardial Perfusion One Day  With borderline EKG and changes during exertion with midepigastric pain, will order stress test to rule out ischemia.     SOB (shortness of breath)  -     Peak Flow  Peak flow WNL. May be secondary cardiac etiology.    Elevated blood pressures without diagnosis of hypertension  Most likely related to anxiety. Continue to monitor.     Elevated liver enzymes  Per [facility]. Continue to monitor.       Mixed hyperlipidemia  Currently on diet and exercise secondary to elevated liver enzymes.       Gastroesophageal reflux disease without esophagitis  Stable. Continue to monitor.      Vitamin D deficiency  Stable. Continue Vitamin D repletion. Continue to monitor.      Anxiety  Patient sees a therapist for CBT.       Tobacco abuse  Patient has quit. Continue to monitor.      Findings and plans discussed with patient who verbalizes understanding and agreement. Will followup with patient once results are in. Patient to followup at clinic PRN or in three months for medical followup.    Matilde Brar MD

## 2017-10-25 ENCOUNTER — OFFICE VISIT (OUTPATIENT)
Dept: GASTROENTEROLOGY | Facility: CLINIC | Age: 32
End: 2017-10-25

## 2017-10-25 VITALS
BODY MASS INDEX: 28.12 KG/M2 | DIASTOLIC BLOOD PRESSURE: 72 MMHG | TEMPERATURE: 96.9 F | WEIGHT: 207.6 LBS | HEIGHT: 72 IN | OXYGEN SATURATION: 99 % | SYSTOLIC BLOOD PRESSURE: 118 MMHG | RESPIRATION RATE: 14 BRPM | HEART RATE: 68 BPM

## 2017-10-25 DIAGNOSIS — R79.89 LFT ELEVATION: ICD-10-CM

## 2017-10-25 DIAGNOSIS — Z23 NEED FOR HEPATITIS A VACCINATION: ICD-10-CM

## 2017-10-25 DIAGNOSIS — K76.0 FATTY LIVER: Primary | ICD-10-CM

## 2017-10-25 PROCEDURE — 99214 OFFICE O/P EST MOD 30 MIN: CPT | Performed by: NURSE PRACTITIONER

## 2017-10-25 NOTE — PATIENT INSTRUCTIONS
Fatty Liver  Fatty liver, also called hepatic steatosis or steatohepatitis, is a condition in which too much fat has built up in your liver cells. The liver removes harmful substances from your bloodstream. It produces fluids your body needs. It also helps your body use and store energy from the food you eat.  In many cases, fatty liver does not cause symptoms or problems. It is often diagnosed when tests are being done for other reasons. However, over time, fatty liver can cause inflammation that may lead to more serious liver problems, such as scarring of the liver (cirrhosis).  CAUSES   Causes of fatty liver may include:   · Drinking too much alcohol.  · Poor nutrition.  · Obesity.  · Cushing syndrome.  · Diabetes.  · Hyperlipidemia.  · Pregnancy.  · Certain drugs.  · Poisons.  · Some viral infections.  RISK FACTORS  You may be more likely to develop fatty liver if you:  · Abuse alcohol.  · Are pregnant.  · Are overweight.  · Have diabetes.  · Have hepatitis.  · Have a high triglyceride level.    SIGNS AND SYMPTOMS   Fatty liver often does not cause any symptoms. In cases where symptoms develop, they can include:  · Fatigue.  · Weakness.  · Weight loss.  · Confusion.    · Abdominal pain.  · Yellowing of your skin and the white parts of your eyes (jaundice).  · Nausea and vomiting.  DIAGNOSIS   Fatty liver may be diagnosed by:   · Physical exam and medical history.  · Blood tests.  · Imaging tests, such as an ultrasound, CT scan, or MRI.  · Liver biopsy. A small sample of liver tissue is removed using a needle. The sample is then looked at under a microscope.  TREATMENT   Fatty liver is often caused by other health conditions. Treatment for fatty liver may involve medicines and lifestyle changes to manage conditions such as:   · Alcoholism.  · High cholesterol.  · Diabetes.  · Being overweight or obese.    HOME CARE INSTRUCTIONS  · Eat a healthy diet as directed by your health care provider.  · Exercise regularly.  This can help you lose weight and control your cholesterol and diabetes. Talk to your health care provider about an exercise plan and which activities are best for you.  · Do not drink alcohol.    · Take medicines only as directed by your health care provider.  SEEK MEDICAL CARE IF:  You have difficulty controlling your:  · Blood sugar.  · Cholesterol.  · Alcohol consumption.  SEEK IMMEDIATE MEDICAL CARE IF:  · You have abdominal pain.  · You have jaundice.  · You have nausea and vomiting.     This information is not intended to replace advice given to you by your health care provider. Make sure you discuss any questions you have with your health care provider.     Document Released: 02/02/2007 Document Revised: 01/08/2016 Document Reviewed: 04/29/2015  [employer] Interactive Patient Education ©2017 [employer]

## 2017-11-09 ENCOUNTER — HOSPITAL ENCOUNTER (OUTPATIENT)
Dept: CARDIOLOGY | Facility: HOSPITAL | Age: 32
Discharge: HOME | End: 2017-11-09

## 2017-11-09 ENCOUNTER — HOSPITAL ENCOUNTER (OUTPATIENT)
Dept: CARDIOLOGY | Facility: HOSPITAL | Age: 32
Discharge: HOME | End: 2017-11-09
Admitting: FAMILY MEDICINE

## 2017-11-09 VITALS
DIASTOLIC BLOOD PRESSURE: 84 MMHG | WEIGHT: 205 LBS | BODY MASS INDEX: 27.77 KG/M2 | HEIGHT: 72 IN | SYSTOLIC BLOOD PRESSURE: 122 MMHG | HEART RATE: 83 BPM

## 2017-11-09 LAB
BH CV STRESS BP STAGE 1: NORMAL
BH CV STRESS BP STAGE 2: NORMAL
BH CV STRESS BP STAGE 3: NORMAL
BH CV STRESS DURATION MIN STAGE 1: 3
BH CV STRESS DURATION MIN STAGE 2: 3
BH CV STRESS DURATION MIN STAGE 3: 3
BH CV STRESS DURATION MIN STAGE 4: 0
BH CV STRESS DURATION SEC STAGE 1: 0
BH CV STRESS DURATION SEC STAGE 2: 0
BH CV STRESS DURATION SEC STAGE 3: 0
BH CV STRESS DURATION SEC STAGE 4: 25
BH CV STRESS GRADE STAGE 1: 10
BH CV STRESS GRADE STAGE 2: 12
BH CV STRESS GRADE STAGE 3: 14
BH CV STRESS GRADE STAGE 4: 16
BH CV STRESS HR STAGE 1: 116
BH CV STRESS HR STAGE 2: 144
BH CV STRESS HR STAGE 3: 171
BH CV STRESS HR STAGE 4: 179
BH CV STRESS METS STAGE 1: 5
BH CV STRESS METS STAGE 2: 7.5
BH CV STRESS METS STAGE 3: 10
BH CV STRESS METS STAGE 4: 13.5
BH CV STRESS O2 STAGE 1: 99
BH CV STRESS O2 STAGE 2: 99
BH CV STRESS O2 STAGE 3: 99
BH CV STRESS O2 STAGE 4: 97
BH CV STRESS PROTOCOL 1: NORMAL
BH CV STRESS SPEED STAGE 1: 1.7
BH CV STRESS SPEED STAGE 2: 2.5
BH CV STRESS SPEED STAGE 3: 3.4
BH CV STRESS SPEED STAGE 4: 4.2
BH CV STRESS STAGE 1: 1
BH CV STRESS STAGE 2: 2
BH CV STRESS STAGE 3: 3
BH CV STRESS STAGE 4: 4
LV EF NUC BP: 74 %
MAXIMAL PREDICTED HEART RATE: 190 BPM
STRESS BASELINE BP: NORMAL MMHG
STRESS BASELINE HR: 84 BPM
STRESS O2 SAT REST: 99 %
STRESS POST EXERCISE DUR MIN: 9 MIN
STRESS POST EXERCISE DUR SEC: 25 SEC
STRESS TARGET HR: 162 BPM

## 2017-11-09 PROCEDURE — 0 TECHNETIUM SESTAMIBI: Performed by: FAMILY MEDICINE

## 2017-11-09 PROCEDURE — 93017 CV STRESS TEST TRACING ONLY: CPT

## 2017-11-09 PROCEDURE — 78452 HT MUSCLE IMAGE SPECT MULT: CPT | Performed by: INTERNAL MEDICINE

## 2017-11-09 PROCEDURE — 93018 CV STRESS TEST I&R ONLY: CPT | Performed by: INTERNAL MEDICINE

## 2017-11-09 PROCEDURE — 78452 HT MUSCLE IMAGE SPECT MULT: CPT

## 2017-11-09 PROCEDURE — A9500 TC99M SESTAMIBI: HCPCS | Performed by: FAMILY MEDICINE

## 2017-11-09 RX ADMIN — TECHNETIUM TC-99M SESTAMIBI 1 DOSE: 1 INJECTION INTRAVENOUS at 10:35

## 2017-11-09 RX ADMIN — TECHNETIUM TC-99M SESTAMIBI 1 DOSE: 1 INJECTION INTRAVENOUS at 13:15

## 2017-11-09 NOTE — H&P
Patient came in with a red, itchy rash on forearms, wrists and hands.  He stated that he did nhot know where it came from.  11/9/17 12:30 CASSIUS Forrester

## 2017-11-14 ENCOUNTER — TELEPHONE (OUTPATIENT)
Dept: FAMILY MEDICINE CLINIC | Facility: CLINIC | Age: 32
End: 2017-11-14

## 2017-11-14 NOTE — TELEPHONE ENCOUNTER
----- Message from Matilde Brar MD sent at 11/12/2017 11:54 PM EST -----  Please let him know that his stress test is totally normal. We sent him because his lips were turning blue when he was in his [employer] class. Has it been still happening? I don't think this has to do with his heart or lungs. It may be the jiu jitsu itself as they learn different breathing patterns. I wonder if there is a connection between his liver enzymes (which have been elevated) and how the blood transfers/gets filtered. It's something to watch and monitor. If it continues or gets worse, an ABG may be in order. His CBC, CMP, CXR, and CT chest have all been fine.      Spoke with patient,he reports this hasn't happened in awhile be he will let you know if it starts again.

## 2020-01-16 ENCOUNTER — OFFICE VISIT (OUTPATIENT)
Dept: FAMILY MEDICINE CLINIC | Facility: CLINIC | Age: 35
End: 2020-01-16

## 2020-01-16 VITALS
HEIGHT: 72 IN | SYSTOLIC BLOOD PRESSURE: 110 MMHG | HEART RATE: 87 BPM | BODY MASS INDEX: 28.99 KG/M2 | DIASTOLIC BLOOD PRESSURE: 70 MMHG | OXYGEN SATURATION: 99 % | WEIGHT: 214 LBS

## 2020-01-16 DIAGNOSIS — E55.9 VITAMIN D DEFICIENCY: ICD-10-CM

## 2020-01-16 DIAGNOSIS — F33.8 SEASONAL AFFECTIVE DISORDER (HCC): ICD-10-CM

## 2020-01-16 DIAGNOSIS — R37 SEXUAL DYSFUNCTION: ICD-10-CM

## 2020-01-16 DIAGNOSIS — E78.2 MIXED HYPERLIPIDEMIA: ICD-10-CM

## 2020-01-16 DIAGNOSIS — Z00.00 PREVENTATIVE HEALTH CARE: Primary | ICD-10-CM

## 2020-01-16 DIAGNOSIS — F41.9 ANXIETY: ICD-10-CM

## 2020-01-16 PROCEDURE — 99385 PREV VISIT NEW AGE 18-39: CPT | Performed by: FAMILY MEDICINE

## 2020-01-16 RX ORDER — BUPROPION HYDROCHLORIDE 150 MG/1
150 TABLET ORAL EVERY MORNING
Qty: 90 TABLET | Refills: 3 | Status: SHIPPED | OUTPATIENT
Start: 2020-01-16

## 2020-03-24 ENCOUNTER — TELEMEDICINE (OUTPATIENT)
Dept: FAMILY MEDICINE CLINIC | Facility: TELEHEALTH | Age: 35
End: 2020-03-24

## 2020-03-24 DIAGNOSIS — R06.02 SHORTNESS OF BREATH ON EXERTION: Primary | ICD-10-CM

## 2020-03-24 DIAGNOSIS — R50.9 FEVER, UNSPECIFIED FEVER CAUSE: ICD-10-CM

## 2020-03-24 DIAGNOSIS — J06.9 UPPER RESPIRATORY TRACT INFECTION, UNSPECIFIED TYPE: ICD-10-CM

## 2020-03-24 PROCEDURE — 99213 OFFICE O/P EST LOW 20 MIN: CPT | Performed by: NURSE PRACTITIONER

## 2020-03-24 RX ORDER — ALBUTEROL SULFATE 90 UG/1
2 AEROSOL, METERED RESPIRATORY (INHALATION) EVERY 4 HOURS PRN
Qty: 1 INHALER | Refills: 0 | Status: SHIPPED | OUTPATIENT
Start: 2020-03-24

## 2020-03-24 RX ORDER — FLUTICASONE PROPIONATE 50 MCG
2 SPRAY, SUSPENSION (ML) NASAL DAILY
Qty: 1 BOTTLE | Refills: 0 | Status: SHIPPED | OUTPATIENT
Start: 2020-03-24

## 2020-03-24 NOTE — PROGRESS NOTES
CHIEF COMPLAINT  Chief Complaint   Patient presents with   • Allergies   • Shortness of Breath   • Fever         ERVIN Olmos is a 34 y.o. male  presents with complaint of 2 week history of fever at highest 99, 2 day history mild chest tightness, tickle in throat with occasional cough, mild sinus congestion with some facial tenderness; mild shortness of breath with exertion--climbing stairs; has zyrtec but only takes when he remembers to take it    Has not been taking any daily antihistamine    Currently, he and family are on self-implemented quarantine    Review of Systems   Constitutional: Positive for fever. Negative for activity change and appetite change.   HENT: Positive for congestion, postnasal drip, sinus pressure and sneezing. Negative for ear pain, sinus pain and sore throat.    Respiratory: Positive for cough, chest tightness and shortness of breath. Negative for wheezing.    Neurological: Positive for headaches. Negative for dizziness.   All other systems reviewed and are negative.      Past Medical History:   Diagnosis Date   • Anxiety    • Fatigue    • GERD (gastroesophageal reflux disease)    • Hyperlipidemia    • Lactose intolerance    • Lower back pain    • Vitamin D deficiency        Family History   Problem Relation Age of Onset   • Thyroid disease Mother    • Diabetes Mother    • Hyperlipidemia Mother    • Mental illness Mother    • Hypertension Father    • VINCENT disease Father    • Stomach cancer Maternal Grandfather    • Diabetes Maternal Grandfather    • Heart attack Maternal Grandfather    • Stroke Paternal Grandfather        Social History     Socioeconomic History   • Marital status:      Spouse name: Not on file   • Number of children: Not on file   • Years of education: Not on file   • Highest education level: Not on file   Tobacco Use   • Smoking status: Former Smoker     Packs/day: 0.50     Types: Cigarettes   • Smokeless tobacco: Never Used   Substance and Sexual  Activity   • Alcohol use: Yes     Comment: maybe 1 drink weekly    • Drug use: No         There were no vitals taken for this visit.    PHYSICAL EXAM  Physical Exam   Constitutional: He is oriented to person, place, and time. He appears well-developed and well-nourished. He is cooperative.   HENT:   Head: Normocephalic and atraumatic.   Right Ear: Hearing normal.   Left Ear: Hearing normal.   Pt directed exam--mild bilateral maxillary tenderness   Pulmonary/Chest: Effort normal.   Observation--NAD, occasional dry cough to clear throat; no wheezing or episodes of shortness of breath during video   Lymphadenopathy:   Pt directed exam--no anterior or posterior cervical tenderness   Neurological: He is alert and oriented to person, place, and time.   Skin: Skin is warm, dry and intact.         Adiel was seen today for allergies, shortness of breath and fever.    Diagnoses and all orders for this visit:    Shortness of breath on exertion  -     albuterol sulfate  (90 Base) MCG/ACT inhaler; Inhale 2 puffs Every 4 (Four) Hours As Needed for Wheezing.  --recommend 2 puffs prior to exercise or other exertion, working in the yard, or other instances that cause symptom  --recommend continued quarantine      Upper respiratory tract infection, unspecified type  -     fluticasone (FLONASE) 50 MCG/ACT nasal spray; 2 sprays into the nostril(s) as directed by provider Daily.  --recommend taking zyrtec every night  --flonase daily    Fever, unspecified fever cause  --tylenol or ibuprofen for fever or pain    **if at any time, experiences fever AND/OR worsening cough AND/OR shortness of breath, has been advised to go to nearest urgent care or emergency department for evaluation AND/OR testing    **if no improvement, but not worsening, may schedule a f/u virtual visit or E-visit      FOLLOW-UP  As discussed with PCP if no improvement or Urgent Care/Emergency Department if worsening of symptoms    Patient verbalizes  understanding of medication dosage, comfort measures, instructions for treatment and follow-up.    MARIAN Suggs  03/24/2020  2:40 PM    This visit was performed via Telehealth.  This patient has been instructed to follow-up with their primary care provider if their symptoms worsen or the treatment provided does not resolve their illness.

## 2020-08-12 ENCOUNTER — TELEMEDICINE (OUTPATIENT)
Dept: FAMILY MEDICINE CLINIC | Facility: TELEHEALTH | Age: 35
End: 2020-08-12

## 2020-08-12 DIAGNOSIS — K21.9 GASTROESOPHAGEAL REFLUX DISEASE, ESOPHAGITIS PRESENCE NOT SPECIFIED: Primary | ICD-10-CM

## 2020-08-12 PROCEDURE — 99213 OFFICE O/P EST LOW 20 MIN: CPT | Performed by: NURSE PRACTITIONER

## 2020-08-12 RX ORDER — ONDANSETRON 4 MG/1
4 TABLET, ORALLY DISINTEGRATING ORAL EVERY 8 HOURS PRN
Qty: 20 TABLET | Refills: 0 | Status: SHIPPED | OUTPATIENT
Start: 2020-08-12

## 2020-08-12 RX ORDER — PANTOPRAZOLE SODIUM 40 MG/1
40 TABLET, DELAYED RELEASE ORAL DAILY
Qty: 30 TABLET | Refills: 0 | Status: SHIPPED | OUTPATIENT
Start: 2020-08-12 | End: 2020-09-19

## 2020-08-12 NOTE — PROGRESS NOTES
CHIEF COMPLAINT  Chief Complaint   Patient presents with   • Nausea       --Symptoms: None  --In the last 14 day, have you had contact with anyone who is ill, has shown any of the symptoms listed above and/or has been diagnosed with 2019 Novel Coronavirus? 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 male  presents with complaint of acid reflux for 3-4 days. He reports some nausea and reflux after eating. He has taken Zantac (he is not sure how old it is).   He reports no burning or chest pain. He does have a mild cough with the reflux with nausea. Sometimes the nausea persist. No vomiting. He did have dry heaves one time. He has no abdominal pain or fever. He has no radiating back pain. He denies diarrhea or changes in his stool.   He reports he has had reflux in the past, but not this bad and responded to treatment.     Review of Systems   Constitutional: Negative for activity change, appetite change, chills, diaphoresis, fatigue, fever and unexpected weight change.   HENT: Negative for congestion, postnasal drip and rhinorrhea.    Respiratory: Positive for cough. Negative for shortness of breath (only after reflux and reports as mild).    Cardiovascular: Negative for chest pain (reports no burning in chest with reflux. ).   Gastrointestinal: Positive for nausea. Negative for abdominal distention, abdominal pain, constipation, diarrhea and vomiting.        Burping.        Past Medical History:   Diagnosis Date   • Anxiety    • Fatigue    • GERD (gastroesophageal reflux disease)    • Hyperlipidemia    • Lactose intolerance    • Lower back pain    • Vitamin D deficiency        Family History   Problem Relation Age of Onset   • Thyroid disease Mother    • Diabetes Mother    • Hyperlipidemia Mother    • Mental illness Mother    •  Hypertension Father    • VINCENT disease Father    • Stomach cancer Maternal Grandfather    • Diabetes Maternal Grandfather    • Heart attack Maternal Grandfather    • Stroke Paternal Grandfather        Social History     Socioeconomic History   • Marital status:      Spouse name: Not on file   • Number of children: Not on file   • Years of education: Not on file   • Highest education level: Not on file   Tobacco Use   • Smoking status: Former Smoker     Packs/day: 0.50     Types: Cigarettes   • Smokeless tobacco: Never Used   Substance and Sexual Activity   • Alcohol use: Yes     Comment: maybe 1 drink weekly    • Drug use: No         There were no vitals taken for this visit.    PHYSICAL EXAM  Physical Exam   Constitutional: He is oriented to person, place, and time.   Pulmonary/Chest: Effort normal.  No respiratory distress.  Able to talk without any breathlessness   Abdominal:   He reports no abdominal distention, pain or tenderness    Neurological: He is alert and oriented to person, place, and time.   Psychiatric: He has a normal mood and affect.   Patient directed exam    Results for orders placed or performed during the hospital encounter of 12/03/18   POCT Rapid Strep A   Result Value Ref Range    Rapid Strep A Screen Positive (A) Negative, VALID, INVALID, Not Performed    Internal Control Passed Passed    Lot Number [id number]     Expiration Date 3/2,020        Adiel was seen today for nausea.    Diagnoses and all orders for this visit:    Gastroesophageal reflux disease, esophagitis presence not specified    Other orders  -     pantoprazole (PROTONIX) 40 MG EC tablet; Take 1 tablet by mouth Daily for 38 days.  -     ondansetron ODT (Zofran ODT) 4 MG disintegrating tablet; Place 1 tablet on the tongue Every 8 (Eight) Hours As Needed for Nausea or Vomiting.      Stop Zantac  Follow up with primary care provider in 2 weeks for a follow up visit.   If symptoms worsen or you develop worsening of symptoms  follow up at urgent care or primary care provider for in person evaluation.       Patient verbalizes understanding of medication dosage, comfort measures, instructions for treatment and follow-up.    MARIAN Morris  08/12/2020  8:06 AM    This visit was performed via Telehealth with only audio portion of visit as patient was  Unable to connect to video. This patient has been instructed to follow-up with their primary care provider if their symptoms worsen or the treatment provided does not resolve their illness.

## 2020-08-12 NOTE — PATIENT INSTRUCTIONS
Stop Zantac  Follow up with primary care provider in 2 weeks for a follow up visit.   If symptoms worsen or you develop worsening of symptoms follow up at urgent care or primary care provider for in person evaluation.         Food Choices for Gastroesophageal Reflux Disease, Adult  When you have gastroesophageal reflux disease (GERD), the foods you eat and your eating habits are very important. Choosing the right foods can help ease your discomfort. Think about working with a nutrition specialist (dietitian) to help you make good choices.  What are tips for following this plan?    Meals  · Choose healthy foods that are low in fat, such as fruits, vegetables, whole grains, low-fat dairy products, and lean meat, fish, and poultry.  · Eat small meals often instead of 3 large meals a day. Eat your meals slowly, and in a place where you are relaxed. Avoid bending over or lying down until 2-3 hours after eating.  · Avoid eating meals 2-3 hours before bed.  · Avoid drinking a lot of liquid with meals.  · Cook foods using methods other than frying. Bake, grill, or broil food instead.  · Avoid or limit:  ? Chocolate.  ? Peppermint or spearmint.  ? Alcohol.  ? Pepper.  ? Black and decaffeinated coffee.  ? Black and decaffeinated tea.  ? Bubbly (carbonated) soft drinks.  ? Caffeinated energy drinks and soft drinks.  · Limit high-fat foods such as:  ? Fatty meat or fried foods.  ? Whole milk, cream, butter, or ice cream.  ? Nuts and nut butters.  ? Pastries, donuts, and sweets made with butter or shortening.  · Avoid foods that cause symptoms. These foods may be different for everyone. Common foods that cause symptoms include:  ? Tomatoes.  ? Oranges, noni, and limes.  ? Peppers.  ? Spicy food.  ? Onions and garlic.  ? Vinegar.  Lifestyle  · Maintain a healthy weight. Ask your doctor what weight is healthy for you. If you need to lose weight, work with your doctor to do so safely.  · Exercise for at least 30 minutes for 5 or  more days each week, or as told by your doctor.  · Wear loose-fitting clothes.  · Do not smoke. If you need help quitting, ask your doctor.  · Sleep with the head of your bed higher than your feet. Use a wedge under the mattress or blocks under the bed frame to raise the head of the bed.  Summary  · When you have gastroesophageal reflux disease (GERD), food and lifestyle choices are very important in easing your symptoms.  · Eat small meals often instead of 3 large meals a day. Eat your meals slowly, and in a place where you are relaxed.  · Limit high-fat foods such as fatty meat or fried foods.  · Avoid bending over or lying down until 2-3 hours after eating.  · Avoid peppermint and spearmint, caffeine, alcohol, and chocolate.  This information is not intended to replace advice given to you by your health care provider. Make sure you discuss any questions you have with your health care provider.  Document Released: 06/18/2013 Document Revised: 04/09/2020 Document Reviewed: 01/23/2018  [employer] Patient Education © 2020 [employer]      Heartburn  Heartburn is a type of pain or discomfort that can happen in the throat or chest. It is often described as a burning pain. It may also cause a bad, acid-like taste in the mouth. Heartburn may feel worse when you lie down or bend over, and it is often worse at night. Heartburn may be caused by stomach contents that move back up into the esophagus (reflux).  Follow these instructions at home:  Eating and drinking    · Avoid certain foods and drinks as told by your health care provider. This may include:  ? Coffee and tea (with or without caffeine).  ? Drinks that contain alcohol.  ? Energy drinks and sports drinks.  ? Carbonated drinks or sodas.  ? Chocolate and cocoa.  ? Peppermint and mint flavorings.  ? Garlic and onions.  ? Horseradish.  ? Spicy and acidic foods, including peppers, chili powder, toussaint powder, vinegar, hot sauces, and barbecue sauce.  ? Citrus fruit  juices and citrus fruits, such as oranges, noni, and limes.  ? Tomato-based foods, such as red sauce, chili, salsa, and pizza with red sauce.  ? Fried and fatty foods, such as donuts, french fries, potato chips, and high-fat dressings.  ? High-fat meats, such as hot dogs and fatty cuts of red and white meats, such as rib eye steak, sausage, ham, and pierre.  ? High-fat dairy items, such as whole milk, butter, and cream cheese.  · Eat small, frequent meals instead of large meals.  · Avoid drinking large amounts of liquid with your meals.  · Avoid eating meals during the 2-3 hours before bedtime.  · Avoid lying down right after you eat.  · Do not exercise right after you eat.  Lifestyle         · If you are overweight, reduce your weight to an amount that is healthy for you. Ask your health care provider for guidance about a safe weight loss goal.  · Do not use any products that contain nicotine or tobacco, such as cigarettes, e-cigarettes, and chewing tobacco. These can make your symptoms worse. If you need help quitting, ask your health care provider.  · Wear loose-fitting clothing. Do not wear anything tight around your waist that causes pressure on your abdomen.  · Raise (elevate) the head of your bed about 6 inches (15 cm) when you sleep.  · Try to reduce your stress, such as with yoga or meditation. If you need help reducing stress, ask your health care provider.  General instructions  · Pay attention to any changes in your symptoms.  · Take over-the-counter and prescription medicines only as told by your health care provider.  ? Do not take aspirin, ibuprofen, or other NSAIDs unless your health care provider told you to do so.  ? Stop medicines only as told by your health care provider. If you stop taking some medicines too quickly, your symptoms may get worse.  · Keep all follow-up visits as told by your health care provider. This is important.  Contact a health care provider if:  · You have new  symptoms.  · You have unexplained weight loss.  · You have difficulty swallowing, or it hurts to swallow.  · You have wheezing or a persistent cough.  · Your symptoms do not improve with treatment.  · You have frequent heartburn for more than 2 weeks.  Get help right away if:  · You have pain in your arms, neck, jaw, teeth, or back.  · You feel sweaty, dizzy, or light-headed.  · You have chest pain or shortness of breath.  · You vomit and your vomit looks like blood or coffee grounds.  · Your stool is bloody or black.  These symptoms may represent a serious problem that is an emergency. Do not wait to see if the symptoms will go away. Get medical help right away. Call your local emergency services (911 in the [place] Do not drive yourself to the hospital.  Summary  · Heartburn is a type of pain or discomfort that can happen in the throat or chest. It is often described as a burning pain. It may also cause a bad, acid-like taste in the mouth.  · Avoid certain foods and drinks as told by your health care provider.  · Take over-the-counter and prescription medicines only as told by your health care provider. Do not take aspirin, ibuprofen, or other NSAIDs unless your health care provider told you to do so.  · Contact a health care provider if your symptoms do not improve or they get worse.  This information is not intended to replace advice given to you by your health care provider. Make sure you discuss any questions you have with your health care provider.  Document Released: 05/06/2010 Document Revised: 05/20/2019 Document Reviewed: 05/20/2019  [employer] Patient Education © 2020 [employer]

## 2021-04-10 ENCOUNTER — APPOINTMENT (OUTPATIENT)
Dept: GENERAL RADIOLOGY | Facility: HOSPITAL | Age: 36
End: 2021-04-10

## 2021-04-10 ENCOUNTER — HOSPITAL ENCOUNTER (EMERGENCY)
Facility: HOSPITAL | Age: 36
Discharge: HOME | End: 2021-04-10
Attending: EMERGENCY MEDICINE | Admitting: EMERGENCY MEDICINE

## 2021-04-10 VITALS
HEART RATE: 66 BPM | SYSTOLIC BLOOD PRESSURE: 127 MMHG | OXYGEN SATURATION: 98 % | HEIGHT: 72 IN | WEIGHT: 205 LBS | BODY MASS INDEX: 27.77 KG/M2 | RESPIRATION RATE: 18 BRPM | TEMPERATURE: 98.4 F | DIASTOLIC BLOOD PRESSURE: 82 MMHG

## 2021-04-10 DIAGNOSIS — E78.5 HYPERLIPIDEMIA, UNSPECIFIED HYPERLIPIDEMIA TYPE: ICD-10-CM

## 2021-04-10 DIAGNOSIS — R07.89 ATYPICAL CHEST PAIN: Primary | ICD-10-CM

## 2021-04-10 LAB
ALBUMIN SERPL-MCNC: 4.4 G/DL (ref 3.5–5.2)
ALBUMIN/GLOB SERPL: 1.5 G/DL
ALP SERPL-CCNC: 77 U/L (ref 39–117)
ALT SERPL W P-5'-P-CCNC: 32 U/L (ref 1–41)
ANION GAP SERPL CALCULATED.3IONS-SCNC: 9 MMOL/L (ref 5–15)
AST SERPL-CCNC: 19 U/L (ref 1–40)
BASOPHILS # BLD AUTO: 0.04 10*3/MM3 (ref 0–0.2)
BASOPHILS NFR BLD AUTO: 0.6 % (ref 0–1.5)
BILIRUB SERPL-MCNC: 0.3 MG/DL (ref 0–1.2)
BUN SERPL-MCNC: 11 MG/DL (ref 6–20)
BUN/CREAT SERPL: 12.1 (ref 7–25)
CALCIUM SPEC-SCNC: 9.3 MG/DL (ref 8.6–10.5)
CHLORIDE SERPL-SCNC: 105 MMOL/L (ref 98–107)
CO2 SERPL-SCNC: 28 MMOL/L (ref 22–29)
CREAT SERPL-MCNC: 0.91 MG/DL (ref 0.76–1.27)
DEPRECATED RDW RBC AUTO: 40 FL (ref 37–54)
EOSINOPHIL # BLD AUTO: 0.18 10*3/MM3 (ref 0–0.4)
EOSINOPHIL NFR BLD AUTO: 2.7 % (ref 0.3–6.2)
ERYTHROCYTE [DISTWIDTH] IN BLOOD BY AUTOMATED COUNT: 12.8 % (ref 12.3–15.4)
GFR SERPL CREATININE-BSD FRML MDRD: 96 ML/MIN/1.73
GLOBULIN UR ELPH-MCNC: 3 GM/DL
GLUCOSE SERPL-MCNC: 105 MG/DL (ref 65–99)
HCT VFR BLD AUTO: 46.4 % (ref 37.5–51)
HGB BLD-MCNC: 15.6 G/DL (ref 13–17.7)
HOLD SPECIMEN: NORMAL
IMM GRANULOCYTES # BLD AUTO: 0.01 10*3/MM3 (ref 0–0.05)
IMM GRANULOCYTES NFR BLD AUTO: 0.2 % (ref 0–0.5)
LIPASE SERPL-CCNC: 27 U/L (ref 13–60)
LYMPHOCYTES # BLD AUTO: 2.65 10*3/MM3 (ref 0.7–3.1)
LYMPHOCYTES NFR BLD AUTO: 40.3 % (ref 19.6–45.3)
MCH RBC QN AUTO: 29.1 PG (ref 26.6–33)
MCHC RBC AUTO-ENTMCNC: 33.6 G/DL (ref 31.5–35.7)
MCV RBC AUTO: 86.6 FL (ref 79–97)
MONOCYTES # BLD AUTO: 0.54 10*3/MM3 (ref 0.1–0.9)
MONOCYTES NFR BLD AUTO: 8.2 % (ref 5–12)
NEUTROPHILS NFR BLD AUTO: 3.16 10*3/MM3 (ref 1.7–7)
NEUTROPHILS NFR BLD AUTO: 48 % (ref 42.7–76)
NRBC BLD AUTO-RTO: 0 /100 WBC (ref 0–0.2)
NT-PROBNP SERPL-MCNC: 8.9 PG/ML (ref 0–450)
PLATELET # BLD AUTO: 350 10*3/MM3 (ref 140–450)
PMV BLD AUTO: 9 FL (ref 6–12)
POTASSIUM SERPL-SCNC: 4.8 MMOL/L (ref 3.5–5.2)
PROT SERPL-MCNC: 7.4 G/DL (ref 6–8.5)
RBC # BLD AUTO: 5.36 10*6/MM3 (ref 4.14–5.8)
SODIUM SERPL-SCNC: 142 MMOL/L (ref 136–145)
TROPONIN T SERPL-MCNC: <0.01 NG/ML (ref 0–0.03)
TROPONIN T SERPL-MCNC: <0.01 NG/ML (ref 0–0.03)
WBC # BLD AUTO: 6.58 10*3/MM3 (ref 3.4–10.8)
WHOLE BLOOD HOLD SPECIMEN: NORMAL
WHOLE BLOOD HOLD SPECIMEN: NORMAL

## 2021-04-10 PROCEDURE — 99285 EMERGENCY DEPT VISIT HI MDM: CPT

## 2021-04-10 PROCEDURE — 80053 COMPREHEN METABOLIC PANEL: CPT | Performed by: EMERGENCY MEDICINE

## 2021-04-10 PROCEDURE — 83690 ASSAY OF LIPASE: CPT | Performed by: EMERGENCY MEDICINE

## 2021-04-10 PROCEDURE — 71045 X-RAY EXAM CHEST 1 VIEW: CPT

## 2021-04-10 PROCEDURE — 83880 ASSAY OF NATRIURETIC PEPTIDE: CPT | Performed by: EMERGENCY MEDICINE

## 2021-04-10 PROCEDURE — 85025 COMPLETE CBC W/AUTO DIFF WBC: CPT | Performed by: EMERGENCY MEDICINE

## 2021-04-10 PROCEDURE — 84484 ASSAY OF TROPONIN QUANT: CPT | Performed by: EMERGENCY MEDICINE

## 2021-04-10 PROCEDURE — 93005 ELECTROCARDIOGRAM TRACING: CPT | Performed by: EMERGENCY MEDICINE

## 2021-04-10 RX ORDER — LIDOCAINE HYDROCHLORIDE 20 MG/ML
15 SOLUTION OROPHARYNGEAL ONCE
Status: COMPLETED | OUTPATIENT
Start: 2021-04-10 | End: 2021-04-10

## 2021-04-10 RX ORDER — SODIUM CHLORIDE 0.9 % (FLUSH) 0.9 %
10 SYRINGE (ML) INJECTION AS NEEDED
Status: DISCONTINUED | OUTPATIENT
Start: 2021-04-10 | End: 2021-04-10 | Stop reason: HOSPADM

## 2021-04-10 RX ORDER — ALUMINA, MAGNESIA, AND SIMETHICONE 2400; 2400; 240 MG/30ML; MG/30ML; MG/30ML
15 SUSPENSION ORAL ONCE
Status: COMPLETED | OUTPATIENT
Start: 2021-04-10 | End: 2021-04-10

## 2021-04-10 RX ADMIN — ALUMINUM HYDROXIDE, MAGNESIUM HYDROXIDE, AND DIMETHICONE 15 ML: 400; 400; 40 SUSPENSION ORAL at 10:35

## 2021-04-10 RX ADMIN — LIDOCAINE HYDROCHLORIDE 15 ML: 20 SOLUTION ORAL; TOPICAL at 10:35

## 2021-04-10 NOTE — ED PROVIDER NOTES
Subjective   Mr. Olmos 35-year-old male has been complaining of having some chest discomfort of the mid lower sternum and epigastric discomfort going off and on for several days.  He reports that this does not radiate.  He does not cause him to be short of breath or diaphoretic.  States he exercises on a regular basis actually preparing for a 72 mile bike ride next weekend.  He reports that they had no prior history of cardiac disease.  He does have a history of reflux and this feels a little bit similar.  He is not used any over-the-counter medications.  He reports that that he has had no diaphoresis or shortness of breath associated no productive cough no fevers or chills no loss of smell or taste.  He does not smoke he does not have hypertension he does have mild hyperlipidemia but controlled with medications.  He does not have a family history of cardiac disease.  He had no previous cardiac testing.  Nothing seems to exacerbate or alleviate the discomfort.  Pain does not radiate up into the neck or down to the arm.      History provided by:  Patient   used: No    Chest Pain  Pain location:  Epigastric and substernal area  Pain quality: dull    Pain radiates to:  Does not radiate  Pain severity:  Mild  Onset quality:  Gradual  Timing:  Intermittent  Progression:  Waxing and waning  Chronicity:  New  Context: not breathing, not drug use, not eating, not intercourse, not lifting, not movement, not raising an arm, not at rest, not stress and not trauma    Relieved by:  Nothing  Worsened by:  Nothing  Ineffective treatments:  None tried  Associated symptoms: abdominal pain and heartburn    Associated symptoms: no altered mental status, no anorexia, no anxiety, no back pain, no claudication, no cough, no diaphoresis, no dizziness, no fatigue, no fever, no near-syncope, no numbness, no shortness of breath, no syncope, no vomiting and no weakness    Risk factors: high cholesterol and male sex     Risk factors: no aortic disease, no coronary artery disease, no diabetes mellitus, no hypertension, not obese, no prior DVT/PE and no smoking        Review of Systems   Constitutional: Negative for diaphoresis, fatigue and fever.   Respiratory: Negative for cough and shortness of breath.    Cardiovascular: Positive for chest pain. Negative for claudication, syncope and near-syncope.   Gastrointestinal: Positive for abdominal pain and heartburn. Negative for anorexia and vomiting.   Genitourinary: Negative for dysuria, frequency and urgency.   Musculoskeletal: Negative for back pain.   Skin: Negative for pallor and rash.   Neurological: Negative for dizziness, weakness and numbness.   Psychiatric/Behavioral: Negative.    All other systems reviewed and are negative.      Past Medical History:   Diagnosis Date   • Anxiety    • Fatigue    • GERD (gastroesophageal reflux disease)    • Hyperlipidemia    • Lactose intolerance    • Lower back pain    • Vitamin D deficiency        Allergies   Allergen Reactions   • Aspirin Anaphylaxis       Past Surgical History:   Procedure Laterality Date   • EYE SURGERY     • EYE SURGERY         Family History   Problem Relation Age of Onset   • Thyroid disease Mother    • Diabetes Mother    • Hyperlipidemia Mother    • Mental illness Mother    • Hypertension Father    • VINCENT disease Father    • Stomach cancer Maternal Grandfather    • Diabetes Maternal Grandfather    • Heart attack Maternal Grandfather    • Stroke Paternal Grandfather        Social History     Socioeconomic History   • Marital status:      Spouse name: Not on file   • Number of children: Not on file   • Years of education: Not on file   • Highest education level: Not on file   Tobacco Use   • Smoking status: Former Smoker     Packs/day: 0.50     Types: Cigarettes   • Smokeless tobacco: Never Used   Substance and Sexual Activity   • Alcohol use: Yes     Comment: maybe 1 drink weekly    • Drug use: No            Objective   Physical Exam  Vitals and nursing note reviewed.   Constitutional:       Appearance: He is well-developed.   HENT:      Head: Normocephalic and atraumatic.      Right Ear: External ear normal.      Left Ear: External ear normal.      Nose: Nose normal.   Eyes:      General: No scleral icterus.     Conjunctiva/sclera: Conjunctivae normal.      Pupils: Pupils are equal, round, and reactive to light.   Neck:      Thyroid: No thyromegaly.   Cardiovascular:      Rate and Rhythm: Normal rate and regular rhythm.      Heart sounds: Normal heart sounds.   Pulmonary:      Effort: Pulmonary effort is normal. No respiratory distress.      Breath sounds: Normal breath sounds. No decreased breath sounds, wheezing, rhonchi or rales.   Chest:      Chest wall: No tenderness.   Abdominal:      General: Bowel sounds are normal. There is no distension or abdominal bruit.      Palpations: Abdomen is soft. There is no fluid wave, hepatomegaly or splenomegaly.      Tenderness: There is no abdominal tenderness. There is no guarding.   Musculoskeletal:         General: Normal range of motion.      Cervical back: Normal range of motion.   Lymphadenopathy:      Cervical: No cervical adenopathy.   Skin:     General: Skin is warm and dry.   Neurological:      Mental Status: He is alert and oriented to person, place, and time.      Cranial Nerves: No cranial nerve deficit.      Coordination: Coordination normal.      Deep Tendon Reflexes: Reflexes are normal and symmetric. Reflexes normal.   Psychiatric:         Behavior: Behavior normal.         Thought Content: Thought content normal.         Judgment: Judgment normal.         Procedures           ED Course                                   .everything  No results found for this or any previous visit (from the past 24 hour(s)).  Note: In addition to lab results from this visit, the labs listed above may include labs taken at another facility or during a different  encounter within the last 24 hours. Please correlate lab times with ED admission and discharge times for further clarification of the services performed during this visit.    XR Chest 1 View   Final Result   No evidence of acute disease in the chest.        This report was finalized on 4/10/2021 10:38 AM by Barrington Ledbetter.            Vitals:    04/10/21 1100 04/10/21 1130 04/10/21 1200 04/10/21 1300   BP: 119/72 127/86 118/79 127/82   Pulse: 70 71 66    Resp:       Temp:       TempSrc:       SpO2: 95% 95% 94% 98%   Weight:       Height:         Medications   aluminum-magnesium hydroxide-simethicone (MAALOX MAX) 400-400-40 MG/5ML suspension 15 mL (15 mL Oral Given 4/10/21 1035)   Lidocaine Viscous HCl (XYLOCAINE) 2 % mouth solution 15 mL (15 mL Mouth/Throat Given 4/10/21 1035)     ECG/EMG Results (last 24 hours)     Procedure Component Value Units Date/Time    ECG 12 Lead [[id number]] Collected: 04/10/21 0954     Updated: 04/10/21 0959    ECG 12 Lead [[id number]] Collected: 04/10/21 1208     Updated: 04/10/21 1212        ECG 12 Lead   Final Result   Test Reason : chest pain   Blood Pressure : **/** mmHG   Vent. Rate : 064 BPM     Atrial Rate : 064 BPM      P-R Int : 168 ms          QRS Dur : 084 ms       QT Int : 398 ms       P-R-T Axes : 029 -06 028 degrees      QTc Int : 410 ms      Normal sinus rhythm   Normal ECG   When compared with ECG of 10-APR-2021 09:54, (Unconfirmed)   No significant change was found   Confirmed by RUBÉN BECKFORD MD ([id number]) on 4/11/2021 10:12:17 AM      Referred By:  SHAKEEL           Confirmed By:RUBÉN BECKFORD MD      ECG 12 Lead                   HEART Score (for prediction of 6-week risk of major adverse cardiac event) reviewed and/or performed as part of the patient evaluation and treatment planning process.  The result associated with this review/performance is: 1       MDM  Number of Diagnoses or Management Options  Atypical chest pain: new and requires workup  Hyperlipidemia,  unspecified hyperlipidemia type: new and requires workup     Amount and/or Complexity of Data Reviewed  Clinical lab tests: reviewed and ordered  Tests in the radiology section of CPT®: ordered and reviewed  Tests in the medicine section of CPT®: ordered and reviewed  Discuss the patient with other providers: yes    Patient Progress  Patient progress: stable      Final diagnoses:   Atypical chest pain   Hyperlipidemia, unspecified hyperlipidemia type       ED Disposition  ED Disposition     ED Disposition Condition Comment    Discharge Stable           [facility] CARDIOLOG[facility]  [address]  [facility] [id number] [phone]        Linus Pitts,   [address] [id number]  [phone]               Medication List      No changes were made to your prescriptions during this visit.          Eduardo Bloom PA  04/11/21 4595

## 2021-04-11 LAB
QT INTERVAL: 398 MS
QTC INTERVAL: 410 MS

## 2021-04-14 LAB
QT INTERVAL: 378 MS
QTC INTERVAL: 405 MS